# Patient Record
Sex: MALE | Race: OTHER | NOT HISPANIC OR LATINO | Employment: FULL TIME | URBAN - METROPOLITAN AREA
[De-identification: names, ages, dates, MRNs, and addresses within clinical notes are randomized per-mention and may not be internally consistent; named-entity substitution may affect disease eponyms.]

---

## 2018-10-11 ENCOUNTER — IMMUNIZATION (OUTPATIENT)
Dept: SOCIAL WORK | Facility: CLINIC | Age: 58
End: 2018-10-11
Payer: COMMERCIAL

## 2018-10-11 ENCOUNTER — HOSPITAL ENCOUNTER (OUTPATIENT)
Facility: MEDICAL CENTER | Age: 58
End: 2018-10-11
Payer: COMMERCIAL

## 2018-10-11 DIAGNOSIS — Z23 NEED FOR VACCINATION: ICD-10-CM

## 2018-10-11 PROCEDURE — 90686 IIV4 VACC NO PRSV 0.5 ML IM: CPT | Performed by: REGISTERED NURSE

## 2018-10-11 PROCEDURE — 82947 ASSAY GLUCOSE BLOOD QUANT: CPT

## 2018-10-11 PROCEDURE — 90471 IMMUNIZATION ADMIN: CPT | Performed by: REGISTERED NURSE

## 2018-10-11 PROCEDURE — 80061 LIPID PANEL: CPT

## 2018-10-12 LAB
CHOLEST SERPL-MCNC: 280 MG/DL (ref 100–199)
FASTING STATUS PATIENT QL REPORTED: NORMAL
GLUCOSE SERPL-MCNC: 114 MG/DL (ref 65–99)
HDLC SERPL-MCNC: 52 MG/DL
LDLC SERPL CALC-MCNC: 203 MG/DL
TRIGL SERPL-MCNC: 124 MG/DL (ref 0–149)

## 2019-07-19 ENCOUNTER — TELEPHONE (OUTPATIENT)
Dept: SCHEDULING | Facility: IMAGING CENTER | Age: 59
End: 2019-07-19

## 2019-09-25 ENCOUNTER — OFFICE VISIT (OUTPATIENT)
Dept: MEDICAL GROUP | Facility: MEDICAL CENTER | Age: 59
End: 2019-09-25
Payer: COMMERCIAL

## 2019-09-25 VITALS
TEMPERATURE: 97.6 F | HEIGHT: 66 IN | OXYGEN SATURATION: 94 % | RESPIRATION RATE: 16 BRPM | DIASTOLIC BLOOD PRESSURE: 82 MMHG | SYSTOLIC BLOOD PRESSURE: 132 MMHG | HEART RATE: 80 BPM | BODY MASS INDEX: 27.32 KG/M2 | WEIGHT: 170 LBS

## 2019-09-25 DIAGNOSIS — R73.09 ELEVATED HEMOGLOBIN A1C: ICD-10-CM

## 2019-09-25 DIAGNOSIS — E78.00 PURE HYPERCHOLESTEROLEMIA: ICD-10-CM

## 2019-09-25 DIAGNOSIS — I10 ESSENTIAL HYPERTENSION: ICD-10-CM

## 2019-09-25 DIAGNOSIS — L57.0 ACTINIC KERATOSIS: ICD-10-CM

## 2019-09-25 DIAGNOSIS — Z12.5 PROSTATE CANCER SCREENING: ICD-10-CM

## 2019-09-25 PROCEDURE — 17000 DESTRUCT PREMALG LESION: CPT | Performed by: NURSE PRACTITIONER

## 2019-09-25 PROCEDURE — 99204 OFFICE O/P NEW MOD 45 MIN: CPT | Mod: 25 | Performed by: NURSE PRACTITIONER

## 2019-09-25 RX ORDER — ATORVASTATIN CALCIUM 40 MG/1
40 TABLET, FILM COATED ORAL
Qty: 90 TAB | Refills: 3 | Status: SHIPPED | OUTPATIENT
Start: 2019-09-25 | End: 2020-11-05

## 2019-09-25 RX ORDER — LISINOPRIL 10 MG/1
10 TABLET ORAL DAILY
Qty: 90 TAB | Refills: 3 | Status: SHIPPED | OUTPATIENT
Start: 2019-09-25 | End: 2020-11-20 | Stop reason: SDUPTHER

## 2019-09-25 ASSESSMENT — PATIENT HEALTH QUESTIONNAIRE - PHQ9: CLINICAL INTERPRETATION OF PHQ2 SCORE: 0

## 2019-09-25 NOTE — ASSESSMENT & PLAN NOTE
Review of labs shows LDL over 200 at lipid screening for [facility] in October 2018.  He had previously been treated with atorvastatin 40 mg daily, ran out of refills and has been off medication more than a year and a half now.  He is agreeable to restarting, denies having had any side effects related to medication use.  He did have very slight ALT elevation in prior labs, reports that this was monitored periodically  No difficulty with chest pain, palpitation, activity intolerance

## 2019-09-25 NOTE — LETTER
[facility]  DELFINA Petersen.  [address]  [place] [address]  Fax: [phone]   Authorization for Release/Disclosure of   Protected Health Information   Name: SIMONA DELANEY : 1960 SSN: [id number]   Address: [address] [place], [place] [address] [place] [address] Phone:    [phone] (home)    I authorize the entity listed below to release/disclose the PHI below to:   [facility]/ELOY Petersen and ELOY Petersen   Provider or Entity Name:  [facility]   Address   City, State, [address] Phone:      Fax:     Reason for request: continuity of care   Information to be released:    [  ] LAST COLONOSCOPY,  including any PATH REPORT and follow-up  [  ] LAST FIT/COLOGUARD RESULT [  ] LAST DEXA  [  ] LAST MAMMOGRAM  [  ] LAST PAP  [  ] LAST LABS [  ] RETINA EXAM REPORT  [  ] IMMUNIZATION RECORDS  [ x ] Release all info      [  ] Check here and initial the line next to each item to release ALL health information INCLUDING  _____ Care and treatment for drug and / or alcohol abuse  _____ HIV testing, infection status, or AIDS  _____ Genetic Testing    DATES OF SERVICE OR TIME PERIOD TO BE DISCLOSED: _____________  I understand and acknowledge that:  * This Authorization may be revoked at any time by you in writing, except if your health information has already been used or disclosed.  * Your health information that will be used or disclosed as a result of you signing this authorization could be re-disclosed by the recipient. If this occurs, your re-disclosed health information may no longer be protected by State or Federal laws.  * You may refuse to sign this Authorization. Your refusal will not affect your ability to obtain treatment.  * This Authorization becomes effective upon signing and will  on (date) __________.      If no date is indicated, this Authorization will  one (1) year from the signature date.    Name: Simona Delaney    Signature:   Date:          9/25/2019       PLEASE FAX REQUESTED RECORDS BACK TO: [phone]

## 2019-09-25 NOTE — ASSESSMENT & PLAN NOTE
Review of labs shows very minimally elevated A1c at 5.8.  No polyuria, polydipsia, numbness or tingling in extremities.  He is active at work and around the house but not doing any specific cardio exercise

## 2019-09-25 NOTE — ASSESSMENT & PLAN NOTE
Rough scaly lesion on the left forearm which is been present for quite some time.  No personal history of skin cancers, no bleeding or pain

## 2019-09-25 NOTE — ASSESSMENT & PLAN NOTE
Previously managed with lisinopril 10 mg daily which had worked well for him, out of medication for more than a year.  Initial blood pressure reading taken by the MA in the office today is 132/82, repeat taken by me 138/82.  No chest pain, dizziness, palpitation.  No chronic cough with medication

## 2019-09-27 ENCOUNTER — OFFICE VISIT (OUTPATIENT)
Dept: DERMATOLOGY | Facility: IMAGING CENTER | Age: 59
End: 2019-09-27
Payer: COMMERCIAL

## 2019-09-27 ENCOUNTER — HOSPITAL ENCOUNTER (OUTPATIENT)
Dept: LAB | Facility: MEDICAL CENTER | Age: 59
End: 2019-09-27
Attending: NURSE PRACTITIONER
Payer: COMMERCIAL

## 2019-09-27 VITALS — WEIGHT: 170 LBS | HEIGHT: 66 IN | BODY MASS INDEX: 27.32 KG/M2 | TEMPERATURE: 97.8 F

## 2019-09-27 DIAGNOSIS — E78.00 PURE HYPERCHOLESTEROLEMIA: ICD-10-CM

## 2019-09-27 DIAGNOSIS — D22.9 MULTIPLE NEVI: ICD-10-CM

## 2019-09-27 DIAGNOSIS — Z12.5 PROSTATE CANCER SCREENING: ICD-10-CM

## 2019-09-27 DIAGNOSIS — L82.1 SEBORRHEIC KERATOSES: ICD-10-CM

## 2019-09-27 DIAGNOSIS — L57.0 ACTINIC KERATOSES: ICD-10-CM

## 2019-09-27 DIAGNOSIS — R73.09 ELEVATED HEMOGLOBIN A1C: ICD-10-CM

## 2019-09-27 DIAGNOSIS — L82.0 INFLAMED SEBORRHEIC KERATOSIS: ICD-10-CM

## 2019-09-27 DIAGNOSIS — L81.4 LENTIGINES: ICD-10-CM

## 2019-09-27 LAB
ALBUMIN SERPL BCP-MCNC: 4.9 G/DL (ref 3.2–4.9)
ALBUMIN/GLOB SERPL: 2.1 G/DL
ALP SERPL-CCNC: 51 U/L (ref 30–99)
ALT SERPL-CCNC: 29 U/L (ref 2–50)
ANION GAP SERPL CALC-SCNC: 9 MMOL/L (ref 0–11.9)
AST SERPL-CCNC: 22 U/L (ref 12–45)
BILIRUB SERPL-MCNC: 0.8 MG/DL (ref 0.1–1.5)
BUN SERPL-MCNC: 21 MG/DL (ref 8–22)
CALCIUM SERPL-MCNC: 9.7 MG/DL (ref 8.5–10.5)
CHLORIDE SERPL-SCNC: 103 MMOL/L (ref 96–112)
CHOLEST SERPL-MCNC: 255 MG/DL (ref 100–199)
CO2 SERPL-SCNC: 28 MMOL/L (ref 20–33)
CREAT SERPL-MCNC: 1.11 MG/DL (ref 0.5–1.4)
EST. AVERAGE GLUCOSE BLD GHB EST-MCNC: 117 MG/DL
GLOBULIN SER CALC-MCNC: 2.3 G/DL (ref 1.9–3.5)
GLUCOSE SERPL-MCNC: 93 MG/DL (ref 65–99)
HBA1C MFR BLD: 5.7 % (ref 0–5.6)
HDLC SERPL-MCNC: 49 MG/DL
LDLC SERPL CALC-MCNC: 181 MG/DL
POTASSIUM SERPL-SCNC: 4.1 MMOL/L (ref 3.6–5.5)
PROT SERPL-MCNC: 7.2 G/DL (ref 6–8.2)
PSA SERPL-MCNC: 2.44 NG/ML (ref 0–4)
SODIUM SERPL-SCNC: 140 MMOL/L (ref 135–145)
TRIGL SERPL-MCNC: 124 MG/DL (ref 0–149)

## 2019-09-27 PROCEDURE — 36415 COLL VENOUS BLD VENIPUNCTURE: CPT

## 2019-09-27 PROCEDURE — 17110 DESTRUCTION B9 LES UP TO 14: CPT | Performed by: NURSE PRACTITIONER

## 2019-09-27 PROCEDURE — 17000 DESTRUCT PREMALG LESION: CPT | Mod: 59 | Performed by: NURSE PRACTITIONER

## 2019-09-27 PROCEDURE — 80053 COMPREHEN METABOLIC PANEL: CPT

## 2019-09-27 PROCEDURE — 80061 LIPID PANEL: CPT

## 2019-09-27 PROCEDURE — 99202 OFFICE O/P NEW SF 15 MIN: CPT | Mod: 25 | Performed by: NURSE PRACTITIONER

## 2019-09-27 PROCEDURE — 17003 DESTRUCT PREMALG LES 2-14: CPT | Mod: 59 | Performed by: NURSE PRACTITIONER

## 2019-09-27 PROCEDURE — 84153 ASSAY OF PSA TOTAL: CPT

## 2019-09-27 PROCEDURE — 83036 HEMOGLOBIN GLYCOSYLATED A1C: CPT

## 2019-09-27 ASSESSMENT — ENCOUNTER SYMPTOMS
CHILLS: 0
DIAPHORESIS: 0
FEVER: 0
WEIGHT LOSS: 0

## 2019-09-27 NOTE — PROGRESS NOTES
"  Dermatology New Patient Visit    Chief Complaint   Patient presents with   • Skin Lesion       Subjective:     HPI:   Camron Mcmahon is a 59 y.o. male presenting for    Here today for spot check  Declines full body SARAY-agrees to sun exposed skin  Spouse noticed two brown lesions     HPI/location: brown lesions on temples  Time present: 1 year  Painful lesion: No  Itching lesion: mild-more irritating   Enlarging lesion: No  Anything make it better or worse? Pt picks them and lesion gets smaller    History of skin cancer: No  History of precancers/actinic keratoses: yes, LT forearm cryac this month by PCP  History of biopsies:No  History of blistering/severe sunburns: yes   Family history of skin cancer:No  Family history of atypical moles:No              Past Medical History:   Diagnosis Date   • Bell's palsy 1980\"s    right face-twitches sometimes still   • Hyperlipidemia    • Hypertension    • Sleep apnea     cpap #12 pressure, no o2   • Snoring        Current Outpatient Medications on File Prior to Visit   Medication Sig Dispense Refill   • atorvastatin (LIPITOR) 40 MG Tab Take 1 Tab by mouth every bedtime. 90 Tab 3   • lisinopril (PRINIVIL) 10 MG Tab Take 1 Tab by mouth every day. 90 Tab 3   • B Complex Vitamins (VITAMIN B COMPLEX PO) Take 1 Tab by mouth every day. Unknown dose   Indications: Nutritional Support     • ascorbic acid (ASCORBIC ACID) 500 MG TABS Take 500 mg by mouth every day.       No current facility-administered medications on file prior to visit.        No Known Allergies    Family History   Problem Relation Age of Onset   • Stroke Mother    • Hypertension Mother    • Hyperlipidemia Mother    • Heart Disease Father    • Hypertension Brother        Social History     Socioeconomic History   • Marital status:      Spouse name: Not on file   • Number of children: Not on file   • Years of education: Not on file   • Highest education level: Not on file   Occupational History   • Not on file " "  Social Needs   • Financial resource strain: Not on file   • Food insecurity:     Worry: Not on file     Inability: Not on file   • Transportation needs:     Medical: Not on file     Non-medical: Not on file   Tobacco Use   • Smoking status: Never Smoker   • Smokeless tobacco: Never Used   Substance and Sexual Activity   • Alcohol use: Yes     Comment: beer twice weekly, last dose 1 beer 5/8/13   • Drug use: No   • Sexual activity: Not on file   Lifestyle   • Physical activity:     Days per week: Not on file     Minutes per session: Not on file   • Stress: Not on file   Relationships   • Social connections:     Talks on phone: Not on file     Gets together: Not on file     Attends Bahai service: Not on file     Active member of club or organization: Not on file     Attends meetings of clubs or organizations: Not on file     Relationship status: Not on file   • Intimate partner violence:     Fear of current or ex partner: Not on file     Emotionally abused: Not on file     Physically abused: Not on file     Forced sexual activity: Not on file   Other Topics Concern   • Not on file   Social History Narrative   • Not on file       Review of Systems   Constitutional: Negative for chills, diaphoresis, fever, malaise/fatigue and weight loss.   Skin: Positive for itching. Negative for rash.        Objective:     A focused mucocutaneous exam was completed including: scalp, hair, ears, face, eyelids, conjunctiva, lips, neck, chest, abdomen, back, left and right upper extremities (including hands/digits and fingernails), left and right lower extremities  with the following pertinent findings listed below. Remaining above-listed examined areas within normal limits / negative for rashes or lesions.    Temp 36.6 °C (97.8 °F)   Ht 1.676 m (5' 6\")   Wt 77.1 kg (170 lb)     Physical Exam   Constitutional: He is well-developed, well-nourished, and in no distress. No distress.   HENT:   Head: Normocephalic.       Eyes: " Conjunctivae are normal.   Pulmonary/Chest: Effort normal.   Neurological: He is alert.   Skin: Skin is warm and dry. No rash noted.        Psychiatric: Affect normal.   Vitals reviewed.      DATA: none applicable to review    Assessment and Plan:     1. Actinic keratoses  CRYOTHERAPY:  Risks (including, but not limited to: hypo or hyperpigmentation, redness, blister, blood blister, recurrence, need for further treatment, infection, scar) and benefits of cryotherapy discussed. Patient verbally agreed to proceed with treatment. 2 cryotherapy freeze thaw cycles of 10 seconds were applied to 3 lesions on left temple and both ear helices with cryac. Patient tolerated procedure well. Aftercare instructions given.      2. Inflamed seborrheic keratosis  CRYOTHERAPY:  Risks (including, but not limited to: hypo or hyperpigmentation, redness, blister, blood blister, recurrence, need for further treatment, infection, scar) and benefits of cryotherapy discussed. Patient verbally agreed to proceed with treatment. 2 cryotherapy freeze thaw cycles of 10 seconds were applied to 1 lesion on right temple with cryac. Patient tolerated procedure well. Aftercare instructions given.      3. Seborrheic keratoses  - Benign-appearing nature of lesions discussed. Advised to return to clinic for any new or concerning changes.      4. Lentigines  - Discussed benign nature of lesions, related to sun exposure  - Discussed sun protection, hand out provided      5. Multiple nevi  - Benign-appearing nature of lesions discussed. Advised to return to clinic for any new or concerning changes.  Skin cancer education  - discussed importance of sun protective clothing, eyewear  - discussed importance of daily use of broad spectrum sunscreen with SPF 30 or greater, as well as need for reapplication ~every 2 hours when exposed to UVR  - discussed importance of regular self-exams, ideally once per month, every 12 months exams in clinic  - KAYLA's of  melanoma discussed  - patient to bring any new or concerning lesions to my attention        Followup: Return in about 1 year (around 9/27/2020) for for SARAY or sooner for changes.    DELFINA [place].

## 2019-09-27 NOTE — PATIENT INSTRUCTIONS
Actinic Keratosis  An actinic keratosis is a precancerous growth on the skin. This means that it could develop into skin cancer if it is not treated. About 1% of these growths (actinic keratoses) turn into skin cancer within one year if they are not treated. It is important to have all of these growths evaluated to determine the best treatment approach.  What are the causes?  This condition is caused by getting too much ultraviolet (UV) radiation from the sun or other UV light sources.  What increases the risk?  The following factors may make you more likely to develop this condition:  · Having light-colored skin and blue eyes.  · Having blonde or red hair.  · Spending a lot of time in the sun.  · Inadequate skin protection when outdoors. This may include:  ¨ Not using sunscreen properly.  ¨ Not covering up skin that is exposed to sunlight.  · Aging. The risk of developing an actinic keratosis increases with age.  What are the signs or symptoms?  Actinic keratoses look like scaly, rough spots of skin. They can be as small as a pinhead or as big as a quarter. They may itch, hurt, or feel sensitive. In most cases, the growths become red. In some cases, they may be skin-colored, light tan, dark tan, pink, or a combination of any of these colors. There may be a small piece of pink or gray skin (skin tag) growing from the actinic keratosis. In some cases, it may be easier to notice actinic keratoses by feeling them, rather than seeing them.  Actinic keratoses appear most often on areas of skin that get a lot of sun exposure, including the scalp, face, ears, lips, upper back, forearms, and the backs of the hands. Sometimes, actinic keratoses disappear, but many reappear a few days to a few weeks later.  How is this diagnosed?  This condition is usually diagnosed with a physical exam. A tissue sample may be removed from the actinic keratosis and examined under a microscope (biopsy).  How is this treated?     Treatment for  this condition may include:  · Scraping off the actinic keratosis (curettage).  · Freezing the actinic keratosis with liquid nitrogen (cryosurgery). This causes the growth to eventually fall off the skin.  · Applying medicated creams or gels to destroy the cells in the growth.  · Applying chemicals to the actinic keratosis to make the outer layers of skin peel off (chemical peel).  · Photodynamic therapy. In this procedure, medicated cream is applied to the actinic keratosis. This cream increases your skin’s sensitivity to light. Then, a strong light is aimed at the actinic keratosis to destroy cells in the growth.  Follow these instructions at home:  Skin care  · Apply cool, wet cloths (cool compresses) to the affected areas.  · Do not scratch your skin.  · Check your skin regularly for any growths, especially growths that:  ¨ Start to itch or bleed.  ¨ Change in size, shape, or color.  Caring for the treated area  · Keep the treated area clean and dry as told by your health care provider.  · Do not apply any medicine, cream, or lotion to the treated area unless your health care provider tells you to do that.  · Do not pick at blisters or try to break them open. This can cause infection and scarring.  · If you have red or irritated skin after treatment, follow instructions from your health care provider about how to take care of the treated area. Make sure you:  ¨ Wash your hands with soap and water before you change your bandage (dressing). If soap and water are not available, use hand .  ¨ Change your dressing as told by your health care provider.  · If you have red or irritated skin after treatment, check your treated area every day for signs of infection. Check for:  ¨ Swelling, pain, or more redness.  ¨ Fluid or blood.  ¨ Warmth.  ¨ Pus or a bad smell.  General instructions  · Take over-the-counter and prescription medicines only as told by your health care provider.  · Return to your normal  activities as told by your health care provider. Ask your health care provider what activities are safe for you.  · Do not use any tobacco products, such as cigarettes, chewing tobacco, and e-cigarettes. If you need help quitting, ask your health care provider.  · Have a skin exam done every year by a health care provider who is a skin conditions specialist (dermatologist).  · Keep all follow-up visits as told by your health care provider. This is important.  How is this prevented?  · Do not get sunburns.  · Try to avoid the sun between 10:00 a.m. and 4:00 p.m. This is when the UV light is the strongest.  · Use a sunscreen or sunblock with SPF 30 (sun protection factor 30) or greater.  · Apply sunscreen before you are exposed to sunlight, and reapply periodically as often as directed by the instructions on the sunscreen container.  · Always wear sunglasses that have UV protection, and always wear hats and clothing to protect your skin from sunlight.  · When possible, avoid medicines that increase your sensitivity to sunlight. These include:  ¨ Certain antibiotic medicines.  ¨ Certain water pills (diuretics).  ¨ Certain prescription medicines that are used to treat acne (retinoids).  · Do not use tanning beds or other indoor tanning devices.  Contact a health care provider if:  · You notice any changes or new growths on your skin.  · You have swelling, pain, or more redness around your treated area.  · You have fluid or blood coming from your treated area.  · Your treated area feels warm to the touch.  · You have pus or a bad smell coming from your treated area.  · You have a fever.  · You have a blister that becomes large and painful.  This information is not intended to replace advice given to you by your health care provider. Make sure you discuss any questions you have with your health care provider.  Document Released: 03/16/2010 Document Revised: 08/18/2017 Document Reviewed: 08/27/2016  [employer]  "Patient Education © 2017 Elsevier Inc.    Seborrheic Keratosis  Seborrheic keratosis is a common, noncancerous (benign) skin growth. This condition causes waxy, rough, tan, brown, or black spots to appear on the skin. These skin growths can be flat or raised.  What are the causes?  The cause of this condition is not known.  What increases the risk?  This condition is more likely to develop in:  · People who have a family history of seborrheic keratosis.  · People who are 50 or older.  · People who are pregnant.  · People who have had estrogen replacement therapy.  What are the signs or symptoms?  This condition often occurs on the face, chest, shoulders, back, or other areas. These growths:  · Are usually painless, but may become irritated and itchy.  · Can be yellow, brown, black, or other colors.  · Are slightly raised or have a flat surface.  · Are sometimes rough or wart-like in texture.  · Are often waxy on the surface.  · Are round or oval-shaped.  · Sometimes look like they are \"stuck on.”  · Often occur in groups, but may occur as a single growth.  How is this diagnosed?  This condition is diagnosed with a medical history and physical exam. A sample of the growth may be tested (skin biopsy). You may need to see a skin specialist (dermatologist).  How is this treated?  Treatment is not usually needed for this condition, unless the growths are irritated or are often bleeding. You may also choose to have the growths removed if you do not like their appearance. Most commonly, these growths are treated with a procedure in which liquid nitrogen is applied to “freeze” off the growth (cryosurgery). They may also be burned off with electricity or cut off.  Follow these instructions at home:  · Watch your growth for any changes.  · Keep all follow-up visits as told by your health care provider. This is important.  · Do not scratch or pick at the growth or growths. This can cause them to become irritated or " infected.  Contact a health care provider if:  · You suddenly have many new growths.  · Your growth bleeds, itches, or hurts.  · Your growth suddenly becomes larger or changes color.  This information is not intended to replace advice given to you by your health care provider. Make sure you discuss any questions you have with your health care provider.  Document Released: 01/20/2012 Document Revised: 05/25/2017 Document Reviewed: 05/04/2016  Elsevier Interactive Patient Education © 2017 Elsevier Inc.

## 2019-09-29 DIAGNOSIS — E78.00 PURE HYPERCHOLESTEROLEMIA: ICD-10-CM

## 2019-09-29 RX ORDER — ROSUVASTATIN CALCIUM 20 MG/1
20 TABLET, COATED ORAL EVERY EVENING
Qty: 90 TAB | Refills: 1 | Status: SHIPPED | OUTPATIENT
Start: 2019-09-29 | End: 2020-11-20 | Stop reason: SDUPTHER

## 2019-10-01 ENCOUNTER — TELEPHONE (OUTPATIENT)
Dept: MEDICAL GROUP | Facility: MEDICAL CENTER | Age: 59
End: 2019-10-01

## 2019-10-01 NOTE — TELEPHONE ENCOUNTER
----- Message from ELOY Petersen sent at 9/29/2019  9:25 AM PDT -----  Please inform pt cholesterol is very high, recommend resuming medication to treat this and reduce risk for heart disease. I have sent prescription to his pharmacy for 1 tab daily, typically at bedtime. We should repeat labs in 3 months, orders placed. Yaneth WISE

## 2019-11-21 ENCOUNTER — OCCUPATIONAL MEDICINE (OUTPATIENT)
Dept: URGENT CARE | Facility: CLINIC | Age: 59
End: 2019-11-21
Payer: COMMERCIAL

## 2019-11-21 VITALS
RESPIRATION RATE: 16 BRPM | DIASTOLIC BLOOD PRESSURE: 74 MMHG | OXYGEN SATURATION: 95 % | TEMPERATURE: 97.8 F | BODY MASS INDEX: 27.92 KG/M2 | SYSTOLIC BLOOD PRESSURE: 126 MMHG | WEIGHT: 173 LBS | HEART RATE: 84 BPM

## 2019-11-21 DIAGNOSIS — S61.213A LACERATION OF LEFT MIDDLE FINGER WITHOUT FOREIGN BODY WITHOUT DAMAGE TO NAIL, INITIAL ENCOUNTER: Primary | ICD-10-CM

## 2019-11-21 LAB
AMP AMPHETAMINE: NORMAL
BREATH ALCOHOL COMMENT: NORMAL
COC COCAINE: NORMAL
INT CON NEG: NORMAL
INT CON POS: NORMAL
MET METHAMPHETAMINES: NORMAL
OPI OPIATES: NORMAL
PCP PHENCYCLIDINE: NORMAL
POC BREATHALIZER: 0 PERCENT (ref 0–0.01)
POC DRUG COMMENT 753798-OCCUPATIONAL HEALTH: NEGATIVE
THC: NORMAL

## 2019-11-21 PROCEDURE — 82075 ASSAY OF BREATH ETHANOL: CPT | Mod: 29 | Performed by: NURSE PRACTITIONER

## 2019-11-21 PROCEDURE — 80305 DRUG TEST PRSMV DIR OPT OBS: CPT | Mod: 29 | Performed by: NURSE PRACTITIONER

## 2019-11-21 PROCEDURE — 12001 RPR S/N/AX/GEN/TRNK 2.5CM/<: CPT | Mod: 29,F2 | Performed by: NURSE PRACTITIONER

## 2019-11-21 ASSESSMENT — ENCOUNTER SYMPTOMS
ROS SKIN COMMENTS: LACERATION
SENSORY CHANGE: 0
PALPITATIONS: 0
CHILLS: 0
TINGLING: 0
SHORTNESS OF BREATH: 0
WHEEZING: 0
FEVER: 0

## 2019-11-21 NOTE — LETTER
[address] [place] [address]  Phone:  [phone] - Fax:  [phone]   [facility] Progress Report and Disability Certification  Date of Service: 11/21/2019   No Show:  No  Date / Time of Next Visit: 12/1/2019   Claim Information   Patient Name: Camron Mcmahon  Claim Number:     Employer: [employer]  Date of Injury: 11/21/2019     Insurer / TPA: [employer]  ID / SSN:     Occupation:   Diagnosis: The encounter diagnosis was Laceration of left middle finger without foreign body without damage to nail, initial encounter.    Medical Information   Related to Industrial Injury? Yes    Subjective Complaints:  DOI 11/21/2019: Patient states was cleaning lamb bones and cutting with knife and knife went through the rack and accidentally cut his left middle finger. States with mild bleeding. Denies numbness/tingling. Last tetanus 2016   Objective Findings: Left middle finger: ROM normal. Laceration 1cm in length to the dorsal surface with minimal bleeding. No tendon involvement or foreign body.    Pre-Existing Condition(s): None   Assessment:   Initial Visit    Status: Additional Care Required  Permanent Disability:No    Plan:      Diagnostics:      Comments:  x2 sutures placed in office. During laceration repair, patient became dizzy and lightheaded/clammy skin and was immediately placed on exam table and reclined. Cool wash cloths and water were given to patient and he was monitored for 15 minutes after   the procedure. Symptoms resolved and patient states he felt better. Vitals stable and appearance returned to normal.    Disability Information   Status: Released to Full Duty    From:  11/21/2019  Through: 12/1/2019 Restrictions are: Temporary   Physical Restrictions   Sitting:    Standing:    Stooping:    Bending:      Squatting:    Walking:    Climbing:    Pushing:      Pulling:    Other:    Reaching Above Shoulder (L):   Reaching Above  Shoulder (R):       Reaching Below Shoulder (L):    Reaching Below Shoulder (R):      Not to exceed Weight Limits   Carrying(hrs):   Weight Limit(lb):   Lifting(hrs):   Weight  Limit(lb):     Comments: x2 sutures placed. Keep covered with Polysporin and bandaid while at work. Return to office 12/1 for suture removal    Repetitive Actions   Hands: i.e. Fine Manipulations from Grasping:     Feet: i.e. Operating Foot Controls:     Driving / Operate Machinery:     Physician Name: ELOY Madden Physician Signature: JOHNNY Lam e-Signature: Dr. Thiago Pratt, Medical Director   Clinic Name / Location: [address] [place] [address] Clinic Phone Number: Dept: [phone]   Appointment Time: 4:30 Pm Visit Start Time: 5:56 PM   Check-In Time:  5:01 Pm Visit Discharge Time:    Original-Treating Physician or Chiropractor    Page 2-Insurer/TPA    Page 3-Employer    Page 4-Employee

## 2019-11-21 NOTE — LETTER
[address] [place] [address]  Phone:  [phone] - Fax:  [phone]   [facility] Progress Report and Disability Certification  Date of Service: 11/21/2019   No Show:  No  Date / Time of Next Visit: 12/1/2019 @ 9:30 AM    Claim Information   Patient Name: Camron Mcmahon  Claim Number:     Employer: [employer]  Date of Injury: 11/21/2019     Insurer / TPA: [employer]  ID / SSN:     Occupation:   Diagnosis: The encounter diagnosis was Laceration of left middle finger without foreign body without damage to nail, initial encounter.    Medical Information   Related to Industrial Injury? Yes    Subjective Complaints:  DOI 11/21/2019: Patient states was cleaning lamb bones and cutting with knife and knife went through the rack and accidentally cut his left middle finger. States with mild bleeding. Denies numbness/tingling. Last tetanus 2016   Objective Findings: Left middle finger: ROM normal. Laceration 1cm in length to the dorsal surface with minimal bleeding. No tendon involvement or foreign body.    Pre-Existing Condition(s): None   Assessment:   Initial Visit    Status: Additional Care Required  Permanent Disability:No    Plan:      Diagnostics:      Comments:  x2 sutures placed in office. During laceration repair, patient became dizzy and lightheaded/clammy skin and was immediately placed on exam table and reclined. Cool wash cloths and water were given to patient and he was monitored for 15 minutes after   the procedure. Symptoms resolved and patient states he felt better. Vitals stable and appearance returned to normal.    Disability Information   Status: Released to Full Duty    From:  11/21/2019  Through: 12/1/2019 Restrictions are: Temporary   Physical Restrictions   Sitting:    Standing:    Stooping:    Bending:      Squatting:    Walking:    Climbing:    Pushing:      Pulling:    Other:    Reaching Above Shoulder (L):   Reaching  Above Shoulder (R):       Reaching Below Shoulder (L):    Reaching Below Shoulder (R):      Not to exceed Weight Limits   Carrying(hrs):   Weight Limit(lb):   Lifting(hrs):   Weight  Limit(lb):     Comments: x2 sutures placed. Keep covered with Polysporin and bandaid while at work. Return to office 12/1 for suture removal    Repetitive Actions   Hands: i.e. Fine Manipulations from Grasping:     Feet: i.e. Operating Foot Controls:     Driving / Operate Machinery:     Physician Name: ELOY Madden Physician Signature: JOHNNY Lam e-Signature: Dr. Thiago Pratt, Medical Director   Clinic Name / Location: [address] [place] [id number] Clinic Phone Number: Dept: [phone]   Appointment Time: 4:30 Pm Visit Start Time: 5:56 PM   Check-In Time:  5:01 Pm Visit Discharge Time:  6:57 PM   Original-Treating Physician or Chiropractor    Page 2-Insurer/TPA    Page 3-Employer    Page 4-Employee

## 2019-11-22 NOTE — PROGRESS NOTES
Subjective:   Camron Mcmahon is a 59 y.o. male who presents for Laceration (NEW WC DOI 11/21/2019 (L) middle finger)    DOI 11/21/2019: Patient states was cleaning lamb bones and cutting with knife and knife went through the rack and accidentally cut his left middle finger. States with mild bleeding. Denies numbness/tingling. Last tetanus 2016   HPI       Review of Systems   Constitutional: Negative for chills and fever.   Respiratory: Negative for shortness of breath and wheezing.    Cardiovascular: Negative for chest pain and palpitations.   Musculoskeletal: Negative for joint pain.   Skin:        Laceration   Neurological: Negative for tingling and sensory change.     Patient's PMH, SocHx, SurgHx, FamHx, Drug allergies and medications reviewed.     Objective:   /74 (BP Location: Left arm, Patient Position: Sitting, BP Cuff Size: Adult)   Pulse 84   Temp 36.6 °C (97.8 °F) (Temporal)   Resp 16   Wt 78.5 kg (173 lb)   SpO2 95%   BMI 27.92 kg/m²   Physical Exam  Constitutional:       General: He is not in acute distress.     Appearance: He is well-developed. He is not diaphoretic.   HENT:      Head: Normocephalic.      Right Ear: Hearing normal.      Left Ear: Hearing normal.      Nose: Nose normal.   Eyes:      General: Lids are normal.      Conjunctiva/sclera: Conjunctivae normal.      Pupils: Pupils are equal, round, and reactive to light.   Neck:      Musculoskeletal: Normal range of motion.   Cardiovascular:      Rate and Rhythm: Normal rate and regular rhythm.      Heart sounds: Normal heart sounds.   Pulmonary:      Effort: Pulmonary effort is normal. No respiratory distress.      Breath sounds: Normal breath sounds. No decreased breath sounds or wheezing.   Musculoskeletal:        Hands:    Skin:     General: Skin is warm.      Findings: No rash.   Neurological:      Mental Status: He is alert.   Psychiatric:         Speech: Speech normal.         Behavior: Behavior normal.         Thought Content:  Thought content normal.         Judgment: Judgment normal.       Left middle finger: ROM normal. Laceration 1cm in length to the dorsal surface with minimal bleeding. No tendon involvement or foreign body.    Assessment/Plan:   Assessment    1. Laceration of left middle finger without foreign body without damage to nail, initial encounter    Procedure: Laceration Repair  -Risks including bleeding, nerve damage, infection, and poor cosmetic outcome discussed. Benefits and alternatives discussed.   -Clean technique with sterile instruments and suture used  -Local anesthesia with 2% lidocaine  -Closed with 2 #  6-0 Nylon interrupted sutures with good wound approximation  -Polysporin and dressing placed  -Patient tolerated well    Restrictions per D 39      Differential diagnosis, natural history, supportive care, and indications for immediate follow-up discussed.     **Please note that all invasive procedures during this visit were performed by myself and/or the Medical Assistant under the supervision of the PA or MD in office**

## 2019-12-02 ENCOUNTER — OCCUPATIONAL MEDICINE (OUTPATIENT)
Dept: OCCUPATIONAL MEDICINE | Facility: CLINIC | Age: 59
End: 2019-12-02
Payer: COMMERCIAL

## 2019-12-02 VITALS
SYSTOLIC BLOOD PRESSURE: 144 MMHG | DIASTOLIC BLOOD PRESSURE: 76 MMHG | WEIGHT: 174 LBS | TEMPERATURE: 97 F | OXYGEN SATURATION: 98 % | HEART RATE: 67 BPM | HEIGHT: 66 IN | BODY MASS INDEX: 27.97 KG/M2

## 2019-12-02 DIAGNOSIS — S61.213D LACERATION OF LEFT MIDDLE FINGER WITHOUT FOREIGN BODY WITHOUT DAMAGE TO NAIL, SUBSEQUENT ENCOUNTER: ICD-10-CM

## 2019-12-02 PROCEDURE — 99202 OFFICE O/P NEW SF 15 MIN: CPT | Performed by: PREVENTIVE MEDICINE

## 2019-12-02 RX ORDER — SIMVASTATIN 20 MG
20 TABLET ORAL NIGHTLY
COMMUNITY
End: 2020-11-05

## 2019-12-02 NOTE — PROGRESS NOTES
"Subjective:      Camron Mcmahon is a 59 y.o. male who presents with Other (WC DOI 11/21/19 middle finger, worse, room16)      DOI 11/21/2019: Patient states was cleaning lamb bones and cutting with knife and knife went through the rack and accidentally cut his left middle finger.  Wound repaired in the urgent care.  Patient states that wound is well-healed.  Denies redness, swelling or pain at the digit.  Denies numbness or tingling.     HPI    ROS  ROS: All systems were reviewed on intake form, form was reviewed and signed. See scanned documents in media. Pertinent positives and negatives included in HPI.    PMH: No pertinent past medical history to this problem  MEDS: Medications were reviewed in Epic  ALLERGIES: No Known Allergies  SOCHX: Works as a  at [employer]   FH: No pertinent family history to this problem     Objective:     /76   Pulse 67   Temp 36.1 °C (97 °F)   Ht 1.676 m (5' 6\")   Wt 78.9 kg (174 lb)   SpO2 98%   BMI 28.08 kg/m²      Physical Exam  Constitutional:       Appearance: Normal appearance.   Cardiovascular:      Rate and Rhythm: Normal rate.   Pulmonary:      Effort: Pulmonary effort is normal.   Skin:     General: Skin is warm and dry.   Neurological:      General: No focal deficit present.      Mental Status: He is alert and oriented to person, place, and time.   Psychiatric:         Mood and Affect: Mood normal.         Thought Content: Thought content normal.         Judgment: Judgment normal.         Left hand: Well-healed laceration left middle finger.  Sutures intact.  No erythema, swelling or drainage.  No heat to the wound.    Removed sutures without complication.       Assessment/Plan:       1. Laceration of left middle finger without foreign body without damage to nail, subsequent encounter  No special care required at this point  Released from care  Full duty  Follow-up as needed    "

## 2019-12-02 NOTE — LETTER
[address],   [address] [place] [address]  Phone:  [phone] - Fax:  [phone]   [facility] Progress Report and Disability Certification  Date of Service: 12/2/2019   No Show:  No  Date / Time of Next Visit:  MMI   Claim Information   Patient Name: Camron Mcmahon  Claim Number:     Employer: [employer]  Date of Injury: 11/21/2019     Insurer / TPA: [employer]  ID / SSN:     Occupation:   Diagnosis: The encounter diagnosis was Laceration of left middle finger without foreign body without damage to nail, subsequent encounter.    Medical Information   Related to Industrial Injury? Yes    Subjective Complaints:  DOI 11/21/2019: Patient states was cleaning lamb bones and cutting with knife and knife went through the rack and accidentally cut his left middle finger.  Wound repaired in the urgent care.  Patient states that wound is well-healed.  Denies redness, swelling or pain at the digit.  Denies numbness or tingling.   Objective Findings: Left hand: Well-healed laceration left middle finger.  Sutures intact.  No erythema, swelling or drainage.  No heat to the wound.    Removed sutures without complication.   Pre-Existing Condition(s):     Assessment:   Condition Improved    Status: Discharged /  MMI  Permanent Disability:No    Plan:      Diagnostics:      Comments:  No special care required at this point  Released from care  Full duty  Follow-up as needed    Disability Information   Status: Released to Full Duty    From:  12/2/2019  Through:   Restrictions are:     Physical Restrictions   Sitting:    Standing:    Stooping:    Bending:      Squatting:    Walking:    Climbing:    Pushing:      Pulling:    Other:    Reaching Above Shoulder (L):   Reaching Above Shoulder (R):       Reaching Below Shoulder (L):    Reaching Below Shoulder (R):      Not to exceed Weight Limits   Carrying(hrs):   Weight Limit(lb):   Lifting(hrs):   Weight   Limit(lb):     Comments:      Repetitive Actions   Hands: i.e. Fine Manipulations from Grasping:     Feet: i.e. Operating Foot Controls:     Driving / Operate Machinery:     Physician Name: Constantine Butcher D.O. Physician Signature: [email or web address] PATEL D.O. e-Signature: Dr. Thiago Pratt, Medical Director   Clinic Name / Location: [address],   [address]  [place] [address] Clinic Phone Number: Dept: [phone]   Appointment Time: 2:00 Pm Visit Start Time: 9:39 AM   Check-In Time:  9:31 Am Visit Discharge Time:  9:53am   Original-Treating Physician or Chiropractor    Page 2-Insurer/TPA    Page 3-Employer    Page 4-Employee

## 2020-05-26 ENCOUNTER — HOSPITAL ENCOUNTER (OUTPATIENT)
Facility: MEDICAL CENTER | Age: 60
End: 2020-05-26
Payer: COMMERCIAL

## 2020-05-29 LAB
SARS-COV-2 RNA SPEC QL NAA+PROBE: NOT DETECTED
SPECIMEN SOURCE: NORMAL

## 2020-10-15 ENCOUNTER — IMMUNIZATION (OUTPATIENT)
Dept: SOCIAL WORK | Facility: CLINIC | Age: 60
End: 2020-10-15
Payer: COMMERCIAL

## 2020-10-15 DIAGNOSIS — Z23 NEED FOR VACCINATION: ICD-10-CM

## 2020-10-15 PROCEDURE — 90471 IMMUNIZATION ADMIN: CPT | Performed by: REGISTERED NURSE

## 2020-10-15 PROCEDURE — 90686 IIV4 VACC NO PRSV 0.5 ML IM: CPT | Performed by: REGISTERED NURSE

## 2020-10-31 ENCOUNTER — HOSPITAL ENCOUNTER (OUTPATIENT)
Dept: LAB | Facility: MEDICAL CENTER | Age: 60
End: 2020-10-31
Payer: COMMERCIAL

## 2020-11-01 LAB — COVID ORDER STATUS COVID19: NORMAL

## 2020-11-02 LAB
SARS-COV-2 RNA RESP QL NAA+PROBE: NOTDETECTED
SPECIMEN SOURCE: NORMAL

## 2020-11-05 ENCOUNTER — HOSPITAL ENCOUNTER (OUTPATIENT)
Dept: RADIOLOGY | Facility: MEDICAL CENTER | Age: 60
End: 2020-11-05
Attending: NURSE PRACTITIONER
Payer: COMMERCIAL

## 2020-11-05 ENCOUNTER — OFFICE VISIT (OUTPATIENT)
Dept: MEDICAL GROUP | Facility: MEDICAL CENTER | Age: 60
End: 2020-11-05
Payer: COMMERCIAL

## 2020-11-05 VITALS
TEMPERATURE: 98.1 F | SYSTOLIC BLOOD PRESSURE: 130 MMHG | WEIGHT: 178 LBS | HEIGHT: 65 IN | RESPIRATION RATE: 16 BRPM | HEART RATE: 74 BPM | OXYGEN SATURATION: 96 % | DIASTOLIC BLOOD PRESSURE: 80 MMHG | BODY MASS INDEX: 29.66 KG/M2

## 2020-11-05 DIAGNOSIS — Z12.11 COLON CANCER SCREENING: ICD-10-CM

## 2020-11-05 DIAGNOSIS — Z23 NEED FOR SHINGLES VACCINE: ICD-10-CM

## 2020-11-05 DIAGNOSIS — R73.09 ELEVATED HEMOGLOBIN A1C: ICD-10-CM

## 2020-11-05 DIAGNOSIS — Z12.5 SCREENING FOR PROSTATE CANCER: ICD-10-CM

## 2020-11-05 DIAGNOSIS — S49.90XA INJURY OF CLAVICLE, INITIAL ENCOUNTER: ICD-10-CM

## 2020-11-05 DIAGNOSIS — Z00.00 ANNUAL PHYSICAL EXAM: ICD-10-CM

## 2020-11-05 DIAGNOSIS — Z11.59 NEED FOR HEPATITIS C SCREENING TEST: ICD-10-CM

## 2020-11-05 DIAGNOSIS — I10 ESSENTIAL HYPERTENSION: ICD-10-CM

## 2020-11-05 DIAGNOSIS — E78.00 PURE HYPERCHOLESTEROLEMIA: ICD-10-CM

## 2020-11-05 DIAGNOSIS — R73.01 ELEVATED FASTING GLUCOSE: ICD-10-CM

## 2020-11-05 DIAGNOSIS — Z12.83 SKIN CANCER SCREENING: ICD-10-CM

## 2020-11-05 DIAGNOSIS — L57.0 ACTINIC KERATOSIS: ICD-10-CM

## 2020-11-05 PROCEDURE — 99213 OFFICE O/P EST LOW 20 MIN: CPT | Mod: 25 | Performed by: NURSE PRACTITIONER

## 2020-11-05 PROCEDURE — 73000 X-RAY EXAM OF COLLAR BONE: CPT | Mod: RT

## 2020-11-05 PROCEDURE — 99396 PREV VISIT EST AGE 40-64: CPT | Mod: 25 | Performed by: NURSE PRACTITIONER

## 2020-11-05 PROCEDURE — 90750 HZV VACC RECOMBINANT IM: CPT | Performed by: NURSE PRACTITIONER

## 2020-11-05 PROCEDURE — 90471 IMMUNIZATION ADMIN: CPT | Performed by: NURSE PRACTITIONER

## 2020-11-05 NOTE — PROGRESS NOTES
Chief Complaint   Patient presents with   • Annual Exam   • Other     R collar bone popped out     Camron Mcmahon is a 60 y.o. male here for annual exam    Essential hypertension  Chronic issue managed with lisinopril 10 mg daily although patient tells me today has only been taking half the dose, no particular reason for this.  Borderline blood pressure in clinic today as well as last year.  No chest pain, dizziness, palpitation    Actinic keratosis  Multiple areas of sun damage on his back as well as face, needing new referral for skin check with dermatology    Elevated hemoglobin A1c  History of slightly elevated A1c.  Watching his diet, active around the house but no regular exercise    Pure hypercholesterolemia  Chronic issue managed with rosuvastatin 10 milligrams daily (taking half 20 mg tablet), due for labs    Injury of clavicle, initial encounter  This is a new concerned for evaluation today.  Patient believes that he may have initially injured himself falling while skiing.  A few months ago he woke up in the morning and suddenly had a lump over the distal clavicle, it seems to flareup at times and cause him more discomfort.  He denies any shoulder joint pain, change in range of motion, activity limitation or decrease in strength.  He is uncertain if he would want to pursue any treatment for this but he would like to get an x-ray    He is due for colonoscopy    Current medicines (including changes today)  Current Outpatient Medications   Medication Sig Dispense Refill   • rosuvastatin (CRESTOR) 20 MG Tab Take 1 Tab by mouth every evening. 90 Tab 1   • lisinopril (PRINIVIL) 10 MG Tab Take 1 Tab by mouth every day. 90 Tab 3   • B Complex Vitamins (VITAMIN B COMPLEX PO) Take 1 Tab by mouth every day. Unknown dose   Indications: Nutritional Support     • ascorbic acid (ASCORBIC ACID) 500 MG TABS Take 500 mg by mouth every day.       No current facility-administered medications for this visit.      He  has a past  "medical history of Bell's palsy ("s), Hyperlipidemia, Hypertension, Sleep apnea, and Snoring.  He  has a past surgical history that includes knee arthroscopy (2013) and medial meniscectomy (2013).  Social History     Tobacco Use   • Smoking status: Never Smoker   • Smokeless tobacco: Never Used   Substance Use Topics   • Alcohol use: Yes     Comment: beer twice weekly, last dose 1 beer 13   • Drug use: No     Social History     Social History Narrative   • Not on file     Family History   Problem Relation Age of Onset   • Stroke Mother    • Hypertension Mother    • Hyperlipidemia Mother    • Heart Disease Father    • Hypertension Brother      Family Status   Relation Name Status   • Mo     • Fa     • Bro  Alive   • Bro  Alive   • Bro  Alive         ROS  Problems listed discussed above, all other systems reviewed and negative     Objective:     /80 (BP Location: Right arm, Patient Position: Sitting, BP Cuff Size: Adult)   Pulse 74   Temp 36.7 °C (98.1 °F) (Temporal)   Resp 16   Ht 1.651 m (5' 5\")   Wt 80.7 kg (178 lb)   SpO2 96%  Body mass index is 29.62 kg/m².  Physical Exam:  General: Alert, oriented in no acute distress.  Eye contact is good, speech is normal, affect calm  HEENT: TMs gray with good landmarks bilaterally. No cervical or supraclavicular lymphadenopathy, thyroid isthmus palpable without masses or nodules.  Lungs: clear to auscultation bilaterally, good aeration, normal effort. No wheeze/ rhonchi/ rales.  CV: regular rate and rhythm, S1, S2. No murmur, no JVD, no edema. Pedal pulses 2 + bilaterally  Abdomen: soft, nontender, BS x4, no hepatosplenomegaly.  Ext: color normal, vascularity normal, temperature normal. No rash or lesions.  MS: Prominence over the right distal clavicle without point tenderness.  No right glenohumeral joint tenderness, full range of motion    Assessment and Plan:   The following treatment plan was discussed  1. Annual physical exam  " Normal physical exam. General health and wellness discussion including healthy diet, regular exercise. 2000 iu Vit d3 advised daily. Preventative health screenings - referrals as listed below. Advised regular dental cleanings, eye exam yearly.     2. Injury of clavicle, initial encounter   bony protrusion over the right distal clavicle, will evaluate x-ray.  Follow-up pending result  DX-CLAVICLE RIGHT   3. Essential hypertension   borderline blood pressure in clinic, advised to take full 10 mg dose of lisinopril daily.  He may report back to me with blood pressure readings   4. Pure hypercholesterolemia   currently taking rosuvastatin 10 mg daily, evaluate labs  Lipid Profile    Comp Metabolic Panel   5. Elevated hemoglobin A1c     6. Elevated fasting glucose  HEMOGLOBIN A1C   7. Screening for prostate cancer  PROSTATE SPECIFIC AG SCREENING   8. Need for hepatitis C screening test  HEP C VIRUS ANTIBODY   9. Actinic keratosis     10. Colon cancer screening  REFERRAL TO GASTROENTEROLOGY   11. Skin cancer screening  REFERRAL TO DERMATOLOGY   12. Need for shingles vaccine  I have placed the below orders and discussed them with an approved delegating provider. The MA is performing the below orders under the direction of Dr. Washburn  Shingles Vaccine (Shingrix)       Followup: pending tests             Please note that this dictation was created using voice recognition software. I have worked with consultants from the vendor as well as technical experts from [employer] to optimize the interface. I have made every reasonable attempt to correct obvious errors, but I expect that there are errors of grammar and possibly content that I did not discover before finalizing the note.

## 2020-11-05 NOTE — ASSESSMENT & PLAN NOTE
Chronic issue managed with rosuvastatin 10 milligrams daily (taking half 20 mg tablet), due for labs

## 2020-11-05 NOTE — ASSESSMENT & PLAN NOTE
History of slightly elevated A1c.  Watching his diet, active around the house but no regular exercise

## 2020-11-05 NOTE — ASSESSMENT & PLAN NOTE
Multiple areas of sun damage on his back as well as face, needing new referral for skin check with dermatology

## 2020-11-05 NOTE — ASSESSMENT & PLAN NOTE
Chronic issue managed with lisinopril 10 mg daily although patient tells me today has only been taking half the dose, no particular reason for this.  Borderline blood pressure in clinic today as well as last year.  No chest pain, dizziness, palpitation

## 2020-11-05 NOTE — ASSESSMENT & PLAN NOTE
This is a new concerned for evaluation today.  Patient believes that he may have initially injured himself falling while skiing.  A few months ago he woke up in the morning and suddenly had a lump over the distal clavicle, it seems to flareup at times and cause him more discomfort.  He denies any shoulder joint pain, change in range of motion, activity limitation or decrease in strength.  He is uncertain if he would want to pursue any treatment for this but he would like to get an x-ray

## 2020-11-20 DIAGNOSIS — E78.00 PURE HYPERCHOLESTEROLEMIA: ICD-10-CM

## 2020-11-20 DIAGNOSIS — I10 ESSENTIAL HYPERTENSION: ICD-10-CM

## 2020-11-21 RX ORDER — LISINOPRIL 10 MG/1
10 TABLET ORAL DAILY
Qty: 90 TAB | Refills: 3 | Status: SHIPPED | OUTPATIENT
Start: 2020-11-21 | End: 2021-12-08 | Stop reason: SDUPTHER

## 2020-11-21 RX ORDER — ROSUVASTATIN CALCIUM 20 MG/1
20 TABLET, COATED ORAL EVERY EVENING
Qty: 90 TAB | Refills: 3 | Status: SHIPPED | OUTPATIENT
Start: 2020-11-21 | End: 2021-12-08 | Stop reason: SDUPTHER

## 2020-11-21 NOTE — TELEPHONE ENCOUNTER
Received request via: Patient    Was the patient seen in the last year in this department? Yes    Does the patient have an active prescription (recently filled or refills available) for medication(s) requested? No     Requested Prescriptions     Pending Prescriptions Disp Refills   • lisinopril (PRINIVIL) 10 MG Tab 90 Tab 3     Sig: Take 1 Tab by mouth every day.   • rosuvastatin (CRESTOR) 20 MG Tab 90 Tab 1     Sig: Take 1 Tab by mouth every evening.

## 2020-12-16 ENCOUNTER — HOSPITAL ENCOUNTER (OUTPATIENT)
Dept: LAB | Facility: MEDICAL CENTER | Age: 60
End: 2020-12-16
Attending: NURSE PRACTITIONER
Payer: COMMERCIAL

## 2020-12-16 DIAGNOSIS — Z11.59 NEED FOR HEPATITIS C SCREENING TEST: ICD-10-CM

## 2020-12-16 DIAGNOSIS — R73.01 ELEVATED FASTING GLUCOSE: ICD-10-CM

## 2020-12-16 DIAGNOSIS — Z12.5 SCREENING FOR PROSTATE CANCER: ICD-10-CM

## 2020-12-16 DIAGNOSIS — E78.00 PURE HYPERCHOLESTEROLEMIA: ICD-10-CM

## 2020-12-16 LAB
ALBUMIN SERPL BCP-MCNC: 4.6 G/DL (ref 3.2–4.9)
ALBUMIN/GLOB SERPL: 1.8 G/DL
ALP SERPL-CCNC: 59 U/L (ref 30–99)
ALT SERPL-CCNC: 48 U/L (ref 2–50)
ANION GAP SERPL CALC-SCNC: 8 MMOL/L (ref 7–16)
AST SERPL-CCNC: 24 U/L (ref 12–45)
BILIRUB SERPL-MCNC: 0.7 MG/DL (ref 0.1–1.5)
BUN SERPL-MCNC: 16 MG/DL (ref 8–22)
CALCIUM SERPL-MCNC: 9.6 MG/DL (ref 8.5–10.5)
CHLORIDE SERPL-SCNC: 100 MMOL/L (ref 96–112)
CHOLEST SERPL-MCNC: 165 MG/DL (ref 100–199)
CO2 SERPL-SCNC: 29 MMOL/L (ref 20–33)
CREAT SERPL-MCNC: 0.89 MG/DL (ref 0.5–1.4)
EST. AVERAGE GLUCOSE BLD GHB EST-MCNC: 117 MG/DL
FASTING STATUS PATIENT QL REPORTED: NORMAL
GLOBULIN SER CALC-MCNC: 2.5 G/DL (ref 1.9–3.5)
GLUCOSE SERPL-MCNC: 94 MG/DL (ref 65–99)
HBA1C MFR BLD: 5.7 % (ref 0–5.6)
HCV AB SER QL: NORMAL
HDLC SERPL-MCNC: 54 MG/DL
LDLC SERPL CALC-MCNC: 93 MG/DL
POTASSIUM SERPL-SCNC: 4.1 MMOL/L (ref 3.6–5.5)
PROT SERPL-MCNC: 7.1 G/DL (ref 6–8.2)
PSA SERPL-MCNC: 2.23 NG/ML (ref 0–4)
SODIUM SERPL-SCNC: 137 MMOL/L (ref 135–145)
TRIGL SERPL-MCNC: 91 MG/DL (ref 0–149)

## 2020-12-16 PROCEDURE — 84153 ASSAY OF PSA TOTAL: CPT

## 2020-12-16 PROCEDURE — 80061 LIPID PANEL: CPT

## 2020-12-16 PROCEDURE — 80053 COMPREHEN METABOLIC PANEL: CPT

## 2020-12-16 PROCEDURE — 83036 HEMOGLOBIN GLYCOSYLATED A1C: CPT

## 2020-12-16 PROCEDURE — 86803 HEPATITIS C AB TEST: CPT

## 2020-12-16 PROCEDURE — 36415 COLL VENOUS BLD VENIPUNCTURE: CPT

## 2021-01-26 ENCOUNTER — APPOINTMENT (RX ONLY)
Dept: URBAN - METROPOLITAN AREA CLINIC 4 | Facility: CLINIC | Age: 61
Setting detail: DERMATOLOGY
End: 2021-01-26

## 2021-01-26 DIAGNOSIS — D22 MELANOCYTIC NEVI: ICD-10-CM

## 2021-01-26 DIAGNOSIS — L82.1 OTHER SEBORRHEIC KERATOSIS: ICD-10-CM

## 2021-01-26 DIAGNOSIS — L81.4 OTHER MELANIN HYPERPIGMENTATION: ICD-10-CM

## 2021-01-26 PROBLEM — D22.5 MELANOCYTIC NEVI OF TRUNK: Status: ACTIVE | Noted: 2021-01-26

## 2021-01-26 PROBLEM — D22.62 MELANOCYTIC NEVI OF LEFT UPPER LIMB, INCLUDING SHOULDER: Status: ACTIVE | Noted: 2021-01-26

## 2021-01-26 PROCEDURE — ? COUNSELING

## 2021-01-26 PROCEDURE — 99203 OFFICE O/P NEW LOW 30 MIN: CPT

## 2021-01-26 ASSESSMENT — LOCATION DETAILED DESCRIPTION DERM
LOCATION DETAILED: LEFT VENTRAL PROXIMAL FOREARM
LOCATION DETAILED: STERNUM
LOCATION DETAILED: RIGHT SUPERIOR UPPER BACK
LOCATION DETAILED: LEFT DISTAL PRETIBIAL REGION

## 2021-01-26 ASSESSMENT — LOCATION ZONE DERM
LOCATION ZONE: ARM
LOCATION ZONE: LEG
LOCATION ZONE: TRUNK

## 2021-01-26 ASSESSMENT — LOCATION SIMPLE DESCRIPTION DERM
LOCATION SIMPLE: CHEST
LOCATION SIMPLE: LEFT FOREARM
LOCATION SIMPLE: RIGHT UPPER BACK
LOCATION SIMPLE: LEFT PRETIBIAL REGION

## 2021-02-03 ENCOUNTER — NON-PROVIDER VISIT (OUTPATIENT)
Dept: MEDICAL GROUP | Facility: MEDICAL CENTER | Age: 61
End: 2021-02-03
Payer: COMMERCIAL

## 2021-02-03 DIAGNOSIS — Z23 NEED FOR VACCINATION: ICD-10-CM

## 2021-02-03 PROCEDURE — 90471 IMMUNIZATION ADMIN: CPT | Performed by: NURSE PRACTITIONER

## 2021-02-03 PROCEDURE — 90750 HZV VACC RECOMBINANT IM: CPT | Performed by: NURSE PRACTITIONER

## 2021-02-03 NOTE — NON-PROVIDER
"Camron Mcmahon is a 60 y.o. male here for a non-provider visit for:   SHINGRIX (Shingles)    Reason for immunization: continue or complete series started at the office  Immunization records indicate need for vaccine: Yes, confirmed with Epic  Minimum interval has been met for this vaccine: Yes  ABN completed: Yes    Order and dose verified by: corrine RICHARDSON Dated  10/30/19 was given to patient: Yes  All IAC Questionnaire questions were answered \"No.\"    Patient tolerated injection and no adverse effects were observed or reported: Yes    Pt scheduled for next dose in series: No  "

## 2021-03-15 DIAGNOSIS — Z23 NEED FOR VACCINATION: ICD-10-CM

## 2021-03-24 ENCOUNTER — IMMUNIZATION (OUTPATIENT)
Dept: FAMILY PLANNING/WOMEN'S HEALTH CLINIC | Facility: IMMUNIZATION CENTER | Age: 61
End: 2021-03-24
Attending: INTERNAL MEDICINE
Payer: COMMERCIAL

## 2021-03-24 DIAGNOSIS — Z23 NEED FOR VACCINATION: ICD-10-CM

## 2021-03-24 DIAGNOSIS — Z23 ENCOUNTER FOR VACCINATION: Primary | ICD-10-CM

## 2021-03-24 PROCEDURE — 0001A PFIZER SARS-COV-2 VACCINE: CPT

## 2021-03-24 PROCEDURE — 91300 PFIZER SARS-COV-2 VACCINE: CPT

## 2021-04-15 ENCOUNTER — IMMUNIZATION (OUTPATIENT)
Dept: FAMILY PLANNING/WOMEN'S HEALTH CLINIC | Facility: IMMUNIZATION CENTER | Age: 61
End: 2021-04-15
Attending: INTERNAL MEDICINE
Payer: COMMERCIAL

## 2021-04-15 DIAGNOSIS — Z23 ENCOUNTER FOR VACCINATION: Primary | ICD-10-CM

## 2021-04-15 PROCEDURE — 91300 PFIZER SARS-COV-2 VACCINE: CPT

## 2021-04-15 PROCEDURE — 0002A PFIZER SARS-COV-2 VACCINE: CPT

## 2021-07-29 PROBLEM — S83.232A COMPLEX TEAR OF MEDIAL MENISCUS OF LEFT KNEE: Status: ACTIVE | Noted: 2021-07-29

## 2021-08-06 ENCOUNTER — TELEPHONE (OUTPATIENT)
Dept: MEDICAL GROUP | Facility: MEDICAL CENTER | Age: 61
End: 2021-08-06

## 2021-08-06 DIAGNOSIS — I49.8 OTHER CARDIAC ARRHYTHMIA: ICD-10-CM

## 2021-08-06 NOTE — TELEPHONE ENCOUNTER
PT ASSISTED BACK TO BED AFTER WORKING WITH PHYSICAL THERAPY. PT REPORTS PAIN
IS WELL MANAGED AT THIS TIME. SHE HAS HER GOING HOME CLOTHS ON AND REPORTS SHE
IS READY TO GO HOME. PT REQUESTING REFERRAL TO CARDIOLOGIST.   EKG WAS DONE FOR PRE-OP AT [facility] AND SHOWS AN ARRYTHMIA AND ORTHO REQUESTING FURTHER EVALUATION.     LOV: 11/2020

## 2021-08-31 ENCOUNTER — OFFICE VISIT (OUTPATIENT)
Dept: CARDIOLOGY | Facility: MEDICAL CENTER | Age: 61
End: 2021-08-31
Payer: COMMERCIAL

## 2021-08-31 VITALS
DIASTOLIC BLOOD PRESSURE: 82 MMHG | BODY MASS INDEX: 29.06 KG/M2 | SYSTOLIC BLOOD PRESSURE: 120 MMHG | HEIGHT: 66 IN | RESPIRATION RATE: 14 BRPM | HEART RATE: 74 BPM | WEIGHT: 180.8 LBS | OXYGEN SATURATION: 96 %

## 2021-08-31 DIAGNOSIS — I10 ESSENTIAL HYPERTENSION: ICD-10-CM

## 2021-08-31 DIAGNOSIS — Z01.810 PREOPERATIVE CARDIOVASCULAR EXAMINATION: ICD-10-CM

## 2021-08-31 DIAGNOSIS — I49.1 ATRIAL PREMATURE COMPLEXES: ICD-10-CM

## 2021-08-31 PROCEDURE — 99203 OFFICE O/P NEW LOW 30 MIN: CPT | Performed by: INTERNAL MEDICINE

## 2021-08-31 PROCEDURE — 93000 ELECTROCARDIOGRAM COMPLETE: CPT | Performed by: INTERNAL MEDICINE

## 2021-08-31 NOTE — PROGRESS NOTES
"Arrhythmia Clinic Note (New Patient)    DOS: 8/31/2021    Referring physician: Mamie Lugo    Chief complaint/Reason for consult: Abnormal EKG, pre-operative assessment    HPI:  Patient is a 60 yo M. History of sleep apnea, HTN, and prior meniscus surgery. He works at the [employer] at the [employer]. He tore his other meniscus and was undergoing evaluation for meniscus surgery. Pre-operative EKG showed a PAC and one EKG showed a PVC. He is asymptomatic. This was concerned both the surgeon and his PCP and he was referred for evaluation and is here for such in the electrophysiology clinic. Today his rhythm is normal sinus. He describes normal exertional capacity. Denies any SOB/CP with exercise and can well perform > 4 METS of activity based on history without difficulty.    ROS (+in BOLD):  Constitutional: Fevers/chills/fatigue/weightloss  HEENT: Blurry vision/eye pain/sore throat/hearing loss  Respiratory: Shortness of breath/cough  Cardiovascular: Chest pain/palpitations/edema/orthopnea/syncope  GI: Nausea/vomitting/diarrhea  MSK: Arthralgias/myagias/muscle weakness  Skin: Rash/sores  Neurological: Numbness/tremors/vertigo  Endocrine: Excessive thirst/polyuria/cold intolerance/heat intolerance  Psych: Depression/anxiety      Past Medical History:   Diagnosis Date   • Bell's palsy 1980\"s    right face-twitches sometimes still   • Hyperlipidemia    • Hypertension    • Sleep apnea     cpap #12 pressure, no o2   • Snoring        Past Surgical History:   Procedure Laterality Date   • KNEE ARTHROSCOPY  5/9/2013    Performed by Brandan De M.D. at [facility]   • MEDIAL MENISCECTOMY  5/9/2013    Performed by Brandan De M.D. at [facility]       Social History     Socioeconomic History   • Marital status:      Spouse name: Not on file   • Number of children: Not on file   • Years of education: Not on file   • Highest education level: Not on file   Occupational History   • Not on file "   Tobacco Use   • Smoking status: Never Smoker   • Smokeless tobacco: Never Used   Vaping Use   • Vaping Use: Never used   Substance and Sexual Activity   • Alcohol use: Yes     Comment: beer twice weekly, last dose 1 beer 5/8/13   • Drug use: No   • Sexual activity: Not on file   Other Topics Concern   • Not on file   Social History Narrative   • Not on file     Social Determinants of Health     Financial Resource Strain:    • Difficulty of Paying Living Expenses:    Food Insecurity:    • Worried About Running Out of Food in the Last Year:    • Ran Out of Food in the Last Year:    Transportation Needs:    • Lack of Transportation (Medical):    • Lack of Transportation (Non-Medical):    Physical Activity:    • Days of Exercise per Week:    • Minutes of Exercise per Session:    Stress:    • Feeling of Stress :    Social Connections:    • Frequency of Communication with Friends and Family:    • Frequency of Social Gatherings with Friends and Family:    • Attends Confucianist Services:    • Active Member of Clubs or Organizations:    • Attends Club or Organization Meetings:    • Marital Status:    Intimate Partner Violence:    • Fear of Current or Ex-Partner:    • Emotionally Abused:    • Physically Abused:    • Sexually Abused:        Family History   Problem Relation Age of Onset   • Stroke Mother    • Hypertension Mother    • Hyperlipidemia Mother    • Heart Disease Father    • Hypertension Brother        No Known Allergies    Current Outpatient Medications   Medication Sig Dispense Refill   • aspirin (ASA) 81 MG Chew Tab chewable tablet Chew 81 mg every day.     • lisinopril (PRINIVIL) 10 MG Tab Take 1 Tab by mouth every day. 90 Tab 3   • rosuvastatin (CRESTOR) 20 MG Tab Take 1 Tab by mouth every evening. 90 Tab 3     No current facility-administered medications for this visit.       Physical Exam:  Vitals:    08/31/21 0853   BP: 120/82   BP Location: Left arm   Patient Position: Sitting   BP Cuff Size: Adult   Pulse:  "74   Resp: 14   SpO2: 96%   Weight: 82 kg (180 lb 12.8 oz)   Height: 1.676 m (5' 6\")     General appearance: NAD, conversant  Eyes: anicteric sclerae, no lid-lag; PERRLA  HENT: Atraumatic; moist mucous membranes, no ulcerations  Neck: Trachea midline; FROM, supple, no thyromegaly  Lungs: CTA, with normal respiratory effort and no intercostal retractions  CV: RRR, no MRGs, no JVD  Abdomen: Soft, non-tender; normal bowel sounds, no HSM  Extremities: No peripheral edema. No clubbing or cyanosis.  Skin: Normal temperature, turgor and texture; no rash or ulcers  Psych: Appropriate affect, alert and oriented to person, place and time      Data:  Labs reviewed    EKG interpreted by me:  NSR    Prior EKGs reviewed, one showing PAC, another PVC vs PAC with aberrancy    Impression/Plan:  1. Atrial premature complexes     2. Essential hypertension     3. Preoperative cardiovascular examination       -Asymptomatic occasional ectopy on EKG, I do not think anything has to be done  -He has normal exertional capacity and I would recommend proceeding with surgery without further cardiac work up  -F/u PRN    Ronald Cool MD        "

## 2021-09-16 LAB — EKG IMPRESSION: NORMAL

## 2021-09-22 ENCOUNTER — HOSPITAL ENCOUNTER (OUTPATIENT)
Facility: MEDICAL CENTER | Age: 61
End: 2021-09-22
Attending: ANESTHESIOLOGY
Payer: COMMERCIAL

## 2021-09-22 LAB
COVID ORDER STATUS COVID19: NORMAL
SARS-COV-2 RNA RESP QL NAA+PROBE: NOTDETECTED
SPECIMEN SOURCE: NORMAL

## 2021-09-22 PROCEDURE — U0005 INFEC AGEN DETEC AMPLI PROBE: HCPCS

## 2021-09-22 PROCEDURE — U0003 INFECTIOUS AGENT DETECTION BY NUCLEIC ACID (DNA OR RNA); SEVERE ACUTE RESPIRATORY SYNDROME CORONAVIRUS 2 (SARS-COV-2) (CORONAVIRUS DISEASE [COVID-19]), AMPLIFIED PROBE TECHNIQUE, MAKING USE OF HIGH THROUGHPUT TECHNOLOGIES AS DESCRIBED BY CMS-2020-01-R: HCPCS

## 2021-12-04 PROBLEM — E66.3 OVERWEIGHT (BMI 25.0-29.9): Status: ACTIVE | Noted: 2021-12-04

## 2021-12-04 PROBLEM — R73.03 PREDIABETES: Status: ACTIVE | Noted: 2019-09-25

## 2021-12-05 NOTE — PROGRESS NOTES
Subjective:     CC:  Diagnoses of Essential hypertension, Prediabetes, Pure hypercholesterolemia, Overweight (BMI 25.0-29.9), and Prostate cancer screening were pertinent to this visit.    HISTORY OF THE PRESENT ILLNESS: Patient is a 61 y.o. male. This pleasant patient is here today to establish care and discuss chronic conditions. His prior PCP was FRANDY Aj.    He had recent left knee partial medial meniscectomy 09/2021 and recovered well.    Problem   Overweight (Bmi 25.0-29.9)    Chronic condition. He tries to eat healthy in general. No sodas. He does not regularly exercise. He is otherwise active at work and around the house, [employer] projects.     Pure Hypercholesterolemia    Chronic condition. Has been on statin since 2019. Current regimen: rosuvastatin 20mg daily. He reports compliance and tolerating medication well. Denies musculoskeletal pain, headache, diarrhea. He does not need medication refill today. No acute concerns at this time.       Essential Hypertension    Chronic condition. Has been taking lisinopril for several years.  Current medication regimen: lisinopril 10mg daily  Patient tolerating and reports compliant with medications. He does not regularly monitor blood pressure at home. Does not need refill of medications today. Denies headache, dizziness, vision changes, chest pain, dyspnea, edema.       Prediabetes    Chronic condition. A1C has been stable. He tries to eat healthy in general. He does not regularly exercise. He is otherwise active at work and around the house.         Current Outpatient Medications Ordered in Epic   Medication Sig Dispense Refill   • lisinopril (PRINIVIL) 10 MG Tab Take 1 Tablet by mouth every day. 90 Tablet 3   • rosuvastatin (CRESTOR) 20 MG Tab Take 1 Tablet by mouth every evening. 90 Tablet 3   • aspirin (ASA) 81 MG Chew Tab chewable tablet Chew 81 mg every day.       No current [facility]-ordered facility-administered medications on file.     SH: works as  " at the [employer], , blended family with children ranging in age from 9 years old to 27, he likes to ski, never smoker, occasional 2-3 beers on days off, denies illicit drugs    Health Maintenance: reviewed and discussed with patient  Vaccines: flu received 10/2021, Shingrix completed 02/2021, Tdap received 05/2016, Pfizer #3 received 11/2021  Colonoscopy 03/2021 diverticulosis, next due 03/2026 due to prior h/o colonic polyps    ROS:   Gen: no fevers/chills, no changes in weight  Eyes: no changes in vision  ENT: no sore throat, no hearing loss, no bloody nose  Pulm: no sob, no cough  CV: no chest pain, no palpitations  GI: no nausea/vomiting, no diarrhea  : no dysuria  MSk: no myalgias  Skin: no rash  Neuro: no headaches, no numbness/tingling  Heme/Lymph: no easy bruising      Objective:     Exam: /78 (BP Location: Left arm, Patient Position: Sitting, BP Cuff Size: Adult)   Pulse 69   Temp 37.2 °C (98.9 °F) (Temporal)   Resp 14   Ht 1.702 m (5' 7\")   Wt 82 kg (180 lb 12.4 oz)   SpO2 95%  Body mass index is 28.31 kg/m².    General: Normal appearing. No distress.  HEENT: Normocephalic. Eyes conjunctiva clear lids without ptosis, ears normal shape and contour, canals are clear bilaterally, tympanic membranes are benign, nasal mucosa benign, oropharynx is without erythema, edema or exudates.   Neck: Supple without JVD or bruit. Thyroid is not enlarged.  Pulmonary: Clear to ausculation.  Normal effort. No rales, ronchi, or wheezing.  Cardiovascular: Regular rate and rhythm without murmur. Carotid and radial pulses are intact and equal bilaterally.  Abdomen: Soft, nontender, nondistended. Normal bowel sounds.  Neurologic: Grossly nonfocal  Skin: Warm and dry.  No obvious lesions.  Musculoskeletal: Normal gait. No extremity cyanosis, clubbing, or edema.  Psych: Normal mood and affect. Alert and oriented x3. Judgment and insight is normal.    Labs:   Lab Results   Component Value Date/Time "    SODIUM 137 12/16/2020 08:41 AM    POTASSIUM 4.1 12/16/2020 08:41 AM    CHLORIDE 100 12/16/2020 08:41 AM    CO2 29 12/16/2020 08:41 AM    ANION 8.0 12/16/2020 08:41 AM    GLUCOSE 94 12/16/2020 08:41 AM    BUN 16 12/16/2020 08:41 AM    CREATININE 0.89 12/16/2020 08:41 AM    CALCIUM 9.6 12/16/2020 08:41 AM    ASTSGOT 24 12/16/2020 08:41 AM    ALTSGPT 48 12/16/2020 08:41 AM    TBILIRUBIN 0.7 12/16/2020 08:41 AM    ALBUMIN 4.6 12/16/2020 08:41 AM    TOTPROTEIN 7.1 12/16/2020 08:41 AM    GLOBULIN 2.5 12/16/2020 08:41 AM    AGRATIO 1.8 12/16/2020 08:41 AM     Lab Results   Component Value Date/Time    HBA1C 5.7 (H) 12/16/2020 08:41 AM      Lab Results   Component Value Date/Time    CHOLSTRLTOT 165 12/16/2020 0841    TRIGLYCERIDE 91 12/16/2020 0841    HDL 54 12/16/2020 0841    LDL 93 12/16/2020 0841     Prostatic Specific Antigen Tot (ng/mL)   Date Value   12/16/2020 2.23   09/27/2019 2.44   05/11/2016 1.15     Current 10-yr ASCVD risk 9.3% based on labs from 12/2020    Assessment & Plan:   61 y.o. male with the following -    Problem List Items Addressed This Visit     Pure hypercholesterolemia  Chronic condition, controlled with medications.  - advised to maintain a healthy weight, regular aerobic exercise, and eating diets lower in saturated fats  - cont current regimen: rosuvastatin 20mg qhs    Relevant Medications    lisinopril (PRINIVIL) 10 MG Tab    rosuvastatin (CRESTOR) 20 MG Tab    Other Relevant Orders    Lipid Profile    Essential hypertension  Chronic condition, controlled with medications. Blood pressure at goal of < 140/90.  - cont current medication: lisinopril 10mg daily  - encouraged home blood pressure monitoring using an appropriately fitting upper-arm cuff on a bare arm, emptying the bladder, avoiding caffeinated beverages for 30 minutes before taking the measurement, resting for five minutes before taking the measurement, keeping the feet on the floor uncrossed and the arm supported with the cuff at  heart level, and not talking during the reading, bring in home blood pressure monitoring device with readings to next follow up visit  - encouraged dietary changes include salt reduction, moderation of alcohol consumption, and a diet high in vegetables and fruit that is low in added sugars and saturated fats (e.g., DASH diet); aerobic and resistance exercises for at least 30 minutes or more at least 3-5 days per week; smoking cessation and stress reduction    Relevant Medications    lisinopril (PRINIVIL) 10 MG Tab    rosuvastatin (CRESTOR) 20 MG Tab    Other Relevant Orders    Comp Metabolic Panel    Prediabetes  Chronic condition, stable. A1C 5.7. ASCVD risk 9.3%.  - discussed about lifestyle modifications including weight loss of about 5-10%, 30 minutes of moderate-intensity physical activity 3-5x/week, and healthy diet (eat foods such as vegetables, fruits, whole grains, lean proteins such as fish or chicken, and low-fat dairy; avoid processed, fried, or sugary foods; drink water instead of sweetened drinks)     Relevant Orders    HEMOGLOBIN A1C    Overweight (BMI 25.0-29.9)  Body mass index is 28.31 kg/m².  - f/u CMP, HbA1C, lipid panel  - discussed lifestyle modifications including weight loss (lose 5-10% of current body weight, no more than 2 pounds per week), healthy diet (eat regular meals with a variety of food, limit daily intake of saturated fat and sodium, limit high-sugar foods, avoid sodas and alcohol, and stay physically active (at least 30 minutes of moderate-intensity physical activity 3-5x/week)     Relevant Orders    Comp Metabolic Panel    HEMOGLOBIN A1C    Lipid Profile      Other Visit Diagnoses     Prostate cancer screening   - based on shared decision-making, we will check PSA for screening every 2 years          Return in about 1 year (around 12/8/2022) for annual physical.    Please note that this dictation was created using voice recognition software. I have made every reasonable attempt  to correct obvious errors, but I expect that there are errors of grammar and possibly content that I did not discover before finalizing the note.

## 2021-12-08 ENCOUNTER — OFFICE VISIT (OUTPATIENT)
Dept: MEDICAL GROUP | Facility: MEDICAL CENTER | Age: 61
End: 2021-12-08
Payer: COMMERCIAL

## 2021-12-08 VITALS
RESPIRATION RATE: 14 BRPM | HEIGHT: 67 IN | HEART RATE: 69 BPM | TEMPERATURE: 98.9 F | DIASTOLIC BLOOD PRESSURE: 78 MMHG | OXYGEN SATURATION: 95 % | SYSTOLIC BLOOD PRESSURE: 126 MMHG | BODY MASS INDEX: 28.37 KG/M2 | WEIGHT: 180.78 LBS

## 2021-12-08 DIAGNOSIS — E66.3 OVERWEIGHT (BMI 25.0-29.9): ICD-10-CM

## 2021-12-08 DIAGNOSIS — Z12.5 PROSTATE CANCER SCREENING: ICD-10-CM

## 2021-12-08 DIAGNOSIS — E78.00 PURE HYPERCHOLESTEROLEMIA: ICD-10-CM

## 2021-12-08 DIAGNOSIS — R73.03 PREDIABETES: ICD-10-CM

## 2021-12-08 DIAGNOSIS — I10 ESSENTIAL HYPERTENSION: ICD-10-CM

## 2021-12-08 PROBLEM — G47.30 SLEEP APNEA: Status: ACTIVE | Noted: 2021-12-08

## 2021-12-08 PROCEDURE — 99214 OFFICE O/P EST MOD 30 MIN: CPT | Performed by: STUDENT IN AN ORGANIZED HEALTH CARE EDUCATION/TRAINING PROGRAM

## 2021-12-08 RX ORDER — LISINOPRIL 10 MG/1
10 TABLET ORAL DAILY
Qty: 90 TABLET | Refills: 3 | Status: SHIPPED | OUTPATIENT
Start: 2021-12-08 | End: 2022-12-05

## 2021-12-08 RX ORDER — ROSUVASTATIN CALCIUM 20 MG/1
20 TABLET, COATED ORAL EVERY EVENING
Qty: 90 TABLET | Refills: 3 | Status: SHIPPED | OUTPATIENT
Start: 2021-12-08 | End: 2022-12-05

## 2021-12-08 ASSESSMENT — PATIENT HEALTH QUESTIONNAIRE - PHQ9: CLINICAL INTERPRETATION OF PHQ2 SCORE: 0

## 2021-12-08 NOTE — LETTER
[facility]  Thomas Montague D.O.  [address]  [place] [address]  Fax: [phone]   Authorization for Release/Disclosure of   Protected Health Information   Name: SIMONA DELANEY : 1960 SSN: [id number]   Address: [address] [place], [place] [address]   [place] [address] Phone:    [phone] (home)    I authorize the entity listed below to release/disclose the PHI below to:   [facility]/Thomas Montague D.O. and Thomas Montague D.O.   Provider or Entity Name:  [facility] Dr. Wellington Gibbs   Address   [facility], [facility], [facility]   Phone:      Fax:     Reason for request: continuity of care   Information to be released:    [XX  ] LAST COLONOSCOPY,  including any PATH REPORT and follow-up  [  ] LAST FIT/COLOGUARD RESULT [  ] LAST DEXA  [  ] LAST MAMMOGRAM  [  ] LAST PAP  [  ] LAST LABS [  ] RETINA EXAM REPORT  [  ] IMMUNIZATION RECORDS  [  ] Release all info      [  ] Check here and initial the line next to each item to release ALL health information INCLUDING  _____ Care and treatment for drug and / or alcohol abuse  _____ HIV testing, infection status, or AIDS  _____ Genetic Testing    DATES OF SERVICE OR TIME PERIOD TO BE DISCLOSED: _____________  I understand and acknowledge that:  * This Authorization may be revoked at any time by you in writing, except if your health information has already been used or disclosed.  * Your health information that will be used or disclosed as a result of you signing this authorization could be re-disclosed by the recipient. If this occurs, your re-disclosed health information may no longer be protected by State or Federal laws.  * You may refuse to sign this Authorization. Your refusal will not affect your ability to obtain treatment.  * This Authorization becomes effective upon signing and will  on (date) __________.      If no date is indicated, this Authorization will  one (1) year from the signature date.    Name: Simona Delaney    Signature:   Date:     2021        PLEASE FAX REQUESTED RECORDS BACK TO: [phone]

## 2021-12-09 PROBLEM — Z86.0100 PERSONAL HISTORY OF COLONIC POLYPS: Status: ACTIVE | Noted: 2021-12-09

## 2021-12-09 PROBLEM — Z86.010 PERSONAL HISTORY OF COLONIC POLYPS: Status: ACTIVE | Noted: 2021-12-09

## 2022-12-09 ENCOUNTER — OFFICE VISIT (OUTPATIENT)
Dept: MEDICAL GROUP | Facility: MEDICAL CENTER | Age: 62
End: 2022-12-09
Payer: COMMERCIAL

## 2022-12-09 VITALS
HEIGHT: 67 IN | WEIGHT: 185 LBS | DIASTOLIC BLOOD PRESSURE: 68 MMHG | SYSTOLIC BLOOD PRESSURE: 112 MMHG | RESPIRATION RATE: 16 BRPM | HEART RATE: 61 BPM | BODY MASS INDEX: 29.03 KG/M2 | TEMPERATURE: 98 F | OXYGEN SATURATION: 97 %

## 2022-12-09 DIAGNOSIS — Z00.00 PREVENTATIVE HEALTH CARE: ICD-10-CM

## 2022-12-09 DIAGNOSIS — E66.3 OVERWEIGHT (BMI 25.0-29.9): ICD-10-CM

## 2022-12-09 DIAGNOSIS — Z11.4 SCREENING FOR HIV (HUMAN IMMUNODEFICIENCY VIRUS): ICD-10-CM

## 2022-12-09 DIAGNOSIS — I10 ESSENTIAL HYPERTENSION: ICD-10-CM

## 2022-12-09 DIAGNOSIS — R73.03 PREDIABETES: ICD-10-CM

## 2022-12-09 DIAGNOSIS — E78.00 PURE HYPERCHOLESTEROLEMIA: ICD-10-CM

## 2022-12-09 DIAGNOSIS — N52.9 ERECTILE DYSFUNCTION, UNSPECIFIED ERECTILE DYSFUNCTION TYPE: ICD-10-CM

## 2022-12-09 PROCEDURE — 99396 PREV VISIT EST AGE 40-64: CPT | Performed by: STUDENT IN AN ORGANIZED HEALTH CARE EDUCATION/TRAINING PROGRAM

## 2022-12-09 RX ORDER — SILDENAFIL 50 MG/1
50 TABLET, FILM COATED ORAL
Qty: 90 TABLET | Refills: 3 | Status: SHIPPED | OUTPATIENT
Start: 2022-12-09 | End: 2023-03-09

## 2022-12-09 ASSESSMENT — PATIENT HEALTH QUESTIONNAIRE - PHQ9: CLINICAL INTERPRETATION OF PHQ2 SCORE: 0

## 2022-12-09 NOTE — PROGRESS NOTES
Subjective:     CC:   Chief Complaint   Patient presents with    Annual Exam       HPI:   Camron Mcmahon is a 62 y.o. male who presents for an annual exam. He is feeling well and has no complaints.    Problem   Preventative Health Care    Patient is here for annual physical today. Feels well overall.       Erectile Dysfunction    Chronic condition.  Current regimen: sildenafil citrate 30mg qhs  He reports compliance and/or tolerance and symptoms controlled with current medication(s). Does need medication(s) refill. No acute concerns.     Overweight (Bmi 25.0-29.9)    Chronic condition. He eats fair in general. No sodas. He does not regularly exercise. He is otherwise active at work and around the house, [employer] projects.     Pure Hypercholesterolemia    Chronic condition. Has been on statin since 2019.   Current regimen: rosuvastatin 20mg daily  He reports compliance and tolerating medication well. Denies musculoskeletal pain, headache, diarrhea. He does not need medication refill today. No acute concerns at this time.       Essential Hypertension    Chronic condition. Has been taking lisinopril for several years.  Current medication regimen: lisinopril 10mg daily  Patient tolerating and reports compliant with medications. He does not regularly monitor blood pressure at home. Does not need refill of medications today. Denies headache, dizziness, vision changes, chest pain, dyspnea, edema.       Prediabetes    Chronic condition. A1C has been stable. He tries to eat healthy in general. He does not regularly exercise. He is otherwise active at work and around the house.         Health Maintenance  Advanced directive: n/a  PT/vit D for falls prevention: n/a  Cholesterol Screening: due  Diabetes Screening: due  AAA Screening: n/a  Aspirin Use: n/a   Diet: eats fair diet in general  Exercise: no regular exercise  Substance Abuse: n/a  Safe in relationship.   Seat belts, bike helmet, gun safety discussed.  Sun  "protection used.    Cancer screening  Colorectal Cancer Screening: done 03/2021, on 5-yr recall   Lung Cancer Screening: n/a  Prostate Cancer Screening/PSA: last done 12/2020    Infectious disease screening/Immunizations  --STI Screening: n/a  --Practices safe sex.  --HIV Screening: due  --Hepatitis C Screening: done 12/2020  --Immunizations:    Influenza: done 12/2022   HPV:  n/a   Tetanus: 05/2016   Shingles: completed 02/2021   Pneumococcal : n/a     COVID-19: Pfizer COVID #4 received 12/2022  Other immunizations: n/a    He  has a past medical history of Bell's palsy (1980\"s), Hyperlipidemia, Hypertension, Sleep apnea, and Snoring.  He  has a past surgical history that includes knee arthroscopy (5/9/2013); meniscectomy, knee, medial (5/9/2013); and meniscectomy, knee, medial (Left, 9/28/2021).  Family History   Problem Relation Age of Onset    Stroke Mother     Hypertension Mother     Hyperlipidemia Mother     Heart Disease Father     Hypertension Brother      Social History     Tobacco Use    Smoking status: Never    Smokeless tobacco: Never   Vaping Use    Vaping Use: Never used   Substance Use Topics    Alcohol use: Yes     Comment: beer twice weekly, last dose 1 beer 5/8/13    Drug use: No       Patient Active Problem List    Diagnosis Date Noted    Preventative health care 12/09/2022    Erectile dysfunction 12/09/2022    Personal history of colonic polyps 12/09/2021    Sleep apnea 12/08/2021    Overweight (BMI 25.0-29.9) 12/04/2021    Complex tear of medial meniscus of left knee 07/29/2021    Injury of clavicle, initial encounter 11/05/2020    Pure hypercholesterolemia 09/25/2019    Essential hypertension 09/25/2019    Actinic keratosis 09/25/2019    Prediabetes 09/25/2019    Osteoarthrosis involving lower leg 05/09/2013       Current Outpatient Medications   Medication Sig Dispense Refill    sildenafil citrate (VIAGRA) 50 MG tablet Take 1 Tablet by mouth 1 time a day as needed for Erectile Dysfunction for " "up to 90 days. 90 Tablet 3    rosuvastatin (CRESTOR) 20 MG Tab TAKE 1 TABLET BY MOUTH EVERY DAY IN THE EVENING 90 Tablet 3    lisinopril (PRINIVIL) 10 MG Tab TAKE 1 TABLET BY MOUTH EVERY DAY 90 Tablet 3     No current facility-administered medications for this visit.    (including changes today)  Allergies: Patient has no known allergies.    Review of Systems   Constitutional: Negative for fever, chills and malaise/fatigue.   HENT: Negative for congestion.    Eyes: Negative for pain.   Respiratory: Negative for cough and shortness of breath.    Cardiovascular: Negative for leg swelling.   Gastrointestinal: Negative for nausea, vomiting, abdominal pain and diarrhea.   Genitourinary: Negative for dysuria and hematuria.   Skin: Negative for rash.   Neurological: Negative for dizziness, focal weakness and headaches.   Endo/Heme/Allergies: Does not bleed easily.   Psychiatric/Behavioral: Negative for depression.  The patient is not nervous/anxious.      Objective:     /68 (BP Location: Left arm, Patient Position: Sitting, BP Cuff Size: Adult)   Pulse 61   Temp 36.7 °C (98 °F) (Temporal)   Resp 16   Ht 1.702 m (5' 7\")   Wt 83.9 kg (185 lb)   SpO2 97%   BMI 28.98 kg/m²   Body mass index is 28.98 kg/m².  Wt Readings from Last 4 Encounters:   12/09/22 83.9 kg (185 lb)   12/08/21 82 kg (180 lb 12.4 oz)   09/28/21 80 kg (176 lb 5.9 oz)   08/31/21 82 kg (180 lb 12.8 oz)       Physical Exam:  Constitutional: Well-developed and well-nourished. Not diaphoretic. No distress.   Skin: Skin is warm and dry. No rash noted.  Head: Atraumatic without lesions.  Eyes: Conjunctivae and extraocular motions are normal. Pupils are equal, round, and reactive to light. No scleral icterus.   Ears:  External ears unremarkable. Tympanic membranes clear and intact.  Nose: Nares patent. Septum midline. Turbinates without erythema nor edema. No discharge.   Mouth/Throat: Dentition is good. Tongue normal. Oropharynx is clear and moist. " Posterior pharynx without erythema or exudates.  Neck: Supple, trachea midline. Normal range of motion. No thyromegaly present. No lymphadenopathy--cervical or supraclavicular.  Cardiovascular: Regular rate and rhythm, S1 and S2 without murmur, rubs, or gallops.    Lungs: Effort normal. Clear to auscultation throughout. No adventitious sounds. No CVA tenderness.  Abdomen: Soft, non tender, and without distention. Active bowel sounds in all four quadrants. No rebound, guarding, masses or HSM.  : deferred  Rectal: deferred  Prostate: deferred  Extremities: No cyanosis, clubbing, erythema, nor edema. Distal pulses intact and symmetric.   Musculoskeletal: All major joints AROM full in all directions without pain.  Neurological: Alert and oriented x 3. DTRs 2+/3 and symmetric. No cranial nerve deficit. 5/5 myotomes. Sensation intact.  Psychiatric:  Behavior, mood, and affect are appropriate.    Assessment and Plan:     1. Preventative health care  - Recommended age appropriate vaccinations and cancer screening  - Labs per orders.  - Vaccinations per orders.  - Counseling about diet, supplements, exercise, skin care.  - Follow up 1 year for annual physical  HEP B SURFACE AB    Comp Metabolic Panel    HEMOGLOBIN A1C    Lipid Profile    TSH WITH REFLEX TO FT4    HIV AG/AB COMBO ASSAY SCREENING      2. Essential hypertension  Chronic condition, stable.  - cont current regimen: lisinopril 10mg daily  Comp Metabolic Panel      3. Pure hypercholesterolemia  Chronic condition, stable.  - cont current regimen: rosuvastatin 20mg daily  Lipid Profile    TSH WITH REFLEX TO FT4      4. Prediabetes  Chronic condition. Stable with healthy lifestyle management.  HEMOGLOBIN A1C      5. Erectile dysfunction, unspecified erectile dysfunction type  Chronic condition, stable. He was previously taking 30mg. We will switch to 50mg per order.  - cont current regimen: sildenafil per order  sildenafil citrate (VIAGRA) 50 MG tablet      6.  Overweight (BMI 25.0-29.9)  - Advised healthy diet/weight loss, regular exercise  Comp Metabolic Panel    HEMOGLOBIN A1C    Lipid Profile    TSH WITH REFLEX TO FT4      7. Screening for HIV (human immunodeficiency virus)  HIV AG/AB COMBO ASSAY SCREENING          HCM:  Labs per orders.  Vaccinations per orders.  Counseling about diet, supplements, exercise, skin care.    Follow-up: Return in about 1 year (around 12/9/2023) for annual physical.

## 2022-12-14 ENCOUNTER — HOSPITAL ENCOUNTER (OUTPATIENT)
Dept: LAB | Facility: MEDICAL CENTER | Age: 62
End: 2022-12-14
Attending: STUDENT IN AN ORGANIZED HEALTH CARE EDUCATION/TRAINING PROGRAM
Payer: COMMERCIAL

## 2022-12-14 DIAGNOSIS — Z11.4 SCREENING FOR HIV (HUMAN IMMUNODEFICIENCY VIRUS): ICD-10-CM

## 2022-12-14 DIAGNOSIS — E78.00 PURE HYPERCHOLESTEROLEMIA: ICD-10-CM

## 2022-12-14 DIAGNOSIS — Z00.00 PREVENTATIVE HEALTH CARE: ICD-10-CM

## 2022-12-14 DIAGNOSIS — E66.3 OVERWEIGHT (BMI 25.0-29.9): ICD-10-CM

## 2022-12-14 DIAGNOSIS — I10 ESSENTIAL HYPERTENSION: ICD-10-CM

## 2022-12-14 DIAGNOSIS — R73.03 PREDIABETES: ICD-10-CM

## 2022-12-14 LAB
ALBUMIN SERPL BCP-MCNC: 4.5 G/DL (ref 3.2–4.9)
ALBUMIN/GLOB SERPL: 1.7 G/DL
ALP SERPL-CCNC: 60 U/L (ref 30–99)
ALT SERPL-CCNC: 59 U/L (ref 2–50)
ANION GAP SERPL CALC-SCNC: 9 MMOL/L (ref 7–16)
AST SERPL-CCNC: 26 U/L (ref 12–45)
BILIRUB SERPL-MCNC: 0.7 MG/DL (ref 0.1–1.5)
BUN SERPL-MCNC: 18 MG/DL (ref 8–22)
CALCIUM ALBUM COR SERPL-MCNC: 9.4 MG/DL (ref 8.5–10.5)
CALCIUM SERPL-MCNC: 9.8 MG/DL (ref 8.5–10.5)
CHLORIDE SERPL-SCNC: 105 MMOL/L (ref 96–112)
CHOLEST SERPL-MCNC: 199 MG/DL (ref 100–199)
CO2 SERPL-SCNC: 26 MMOL/L (ref 20–33)
CREAT SERPL-MCNC: 0.98 MG/DL (ref 0.5–1.4)
EST. AVERAGE GLUCOSE BLD GHB EST-MCNC: 134 MG/DL
FASTING STATUS PATIENT QL REPORTED: NORMAL
GFR SERPLBLD CREATININE-BSD FMLA CKD-EPI: 86 ML/MIN/1.73 M 2
GLOBULIN SER CALC-MCNC: 2.6 G/DL (ref 1.9–3.5)
GLUCOSE SERPL-MCNC: 102 MG/DL (ref 65–99)
HBA1C MFR BLD: 6.3 % (ref 4–5.6)
HBV SURFACE AB SERPL IA-ACNC: <3.5 MIU/ML (ref 0–10)
HDLC SERPL-MCNC: 48 MG/DL
HIV 1+2 AB+HIV1 P24 AG SERPL QL IA: NORMAL
LDLC SERPL CALC-MCNC: 128 MG/DL
POTASSIUM SERPL-SCNC: 4.1 MMOL/L (ref 3.6–5.5)
PROT SERPL-MCNC: 7.1 G/DL (ref 6–8.2)
SODIUM SERPL-SCNC: 140 MMOL/L (ref 135–145)
TRIGL SERPL-MCNC: 117 MG/DL (ref 0–149)
TSH SERPL DL<=0.005 MIU/L-ACNC: 2.7 UIU/ML (ref 0.38–5.33)

## 2022-12-14 PROCEDURE — 36415 COLL VENOUS BLD VENIPUNCTURE: CPT

## 2022-12-14 PROCEDURE — 80061 LIPID PANEL: CPT

## 2022-12-14 PROCEDURE — 84443 ASSAY THYROID STIM HORMONE: CPT

## 2022-12-14 PROCEDURE — 83036 HEMOGLOBIN GLYCOSYLATED A1C: CPT

## 2022-12-14 PROCEDURE — 87389 HIV-1 AG W/HIV-1&-2 AB AG IA: CPT

## 2022-12-14 PROCEDURE — 80053 COMPREHEN METABOLIC PANEL: CPT

## 2022-12-14 PROCEDURE — 86706 HEP B SURFACE ANTIBODY: CPT

## 2022-12-17 DIAGNOSIS — E78.00 PURE HYPERCHOLESTEROLEMIA: ICD-10-CM

## 2022-12-17 DIAGNOSIS — R73.03 PREDIABETES: ICD-10-CM

## 2023-06-14 ENCOUNTER — OFFICE VISIT (OUTPATIENT)
Dept: MEDICAL GROUP | Facility: MEDICAL CENTER | Age: 63
End: 2023-06-14
Payer: COMMERCIAL

## 2023-06-14 VITALS
WEIGHT: 182.32 LBS | BODY MASS INDEX: 28.62 KG/M2 | HEART RATE: 60 BPM | SYSTOLIC BLOOD PRESSURE: 112 MMHG | DIASTOLIC BLOOD PRESSURE: 66 MMHG | HEIGHT: 67 IN | TEMPERATURE: 98 F | OXYGEN SATURATION: 95 %

## 2023-06-14 DIAGNOSIS — E78.00 PURE HYPERCHOLESTEROLEMIA: ICD-10-CM

## 2023-06-14 DIAGNOSIS — E66.3 OVERWEIGHT (BMI 25.0-29.9): ICD-10-CM

## 2023-06-14 DIAGNOSIS — R73.03 PREDIABETES: ICD-10-CM

## 2023-06-14 DIAGNOSIS — I10 ESSENTIAL HYPERTENSION: ICD-10-CM

## 2023-06-14 PROCEDURE — 99214 OFFICE O/P EST MOD 30 MIN: CPT | Performed by: STUDENT IN AN ORGANIZED HEALTH CARE EDUCATION/TRAINING PROGRAM

## 2023-06-14 PROCEDURE — 3074F SYST BP LT 130 MM HG: CPT | Performed by: STUDENT IN AN ORGANIZED HEALTH CARE EDUCATION/TRAINING PROGRAM

## 2023-06-14 PROCEDURE — 3078F DIAST BP <80 MM HG: CPT | Performed by: STUDENT IN AN ORGANIZED HEALTH CARE EDUCATION/TRAINING PROGRAM

## 2023-06-14 RX ORDER — ROSUVASTATIN CALCIUM 20 MG/1
20 TABLET, COATED ORAL EVERY EVENING
Qty: 90 TABLET | Refills: 3 | Status: SHIPPED | OUTPATIENT
Start: 2023-06-14 | End: 2024-01-17 | Stop reason: SDUPTHER

## 2023-06-14 RX ORDER — ASPIRIN 81 MG/1
81 TABLET ORAL DAILY
COMMUNITY
End: 2023-06-14

## 2023-06-14 RX ORDER — LISINOPRIL 10 MG/1
10 TABLET ORAL DAILY
Qty: 90 TABLET | Refills: 3 | Status: SHIPPED | OUTPATIENT
Start: 2023-06-14 | End: 2024-01-17 | Stop reason: SDUPTHER

## 2023-06-14 ASSESSMENT — PATIENT HEALTH QUESTIONNAIRE - PHQ9: CLINICAL INTERPRETATION OF PHQ2 SCORE: 0

## 2023-06-14 NOTE — PROGRESS NOTES
"Subjective:     CC: chronic conditions follow up    HPI:   Camron presents today for chronic conditions follow up    Problem   Overweight (Bmi 25.0-29.9)    Chronic condition. He eats fair in general. No sodas. He does not regularly exercise. 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     Pure Hypercholesterolemia    Chronic condition. Has been on statin since 2019.   Current regimen: rosuvastatin 20mg daily  He reports compliance and tolerating medication well. Denies musculoskeletal pain, headache, diarrhea. He does not need medication refill today. No acute concerns at this time.       Essential Hypertension    Chronic condition. Has been taking lisinopril for several years.  Current medication regimen: lisinopril 10mg daily  Patient tolerating and reports compliant with medications. He does not regularly monitor blood pressure at home. Does not need refill of medications today. Denies headache, dizziness, vision changes, chest pain, dyspnea, edema.       Prediabetes    Chronic condition. A1C has been stable. He tries to eat healthy in general. He does not regularly exercise. He is otherwise active at work and around the house.         Current Outpatient Medications Ordered in Epic   Medication Sig Dispense Refill    lisinopril (PRINIVIL) 10 MG Tab Take 1 Tablet by mouth every day. 90 Tablet 3    rosuvastatin (CRESTOR) 20 MG Tab Take 1 Tablet by mouth every evening. 90 Tablet 3     No current Epic-ordered facility-administered medications on file.     SH: works as  at the [employer], , blended family with children ranging in age from 9 years old to 27, he likes to ski, never smoker, occasional 2-3 beers on days off, denies illicit drugs    ROS:  See HPI    Objective:     Exam:  /66 (BP Location: Left arm, Patient Position: Sitting, BP Cuff Size: Adult)   Pulse 60   Temp 36.7 °C (98 °F) (Temporal)   Ht 1.702 m (5' 7\")   Wt 82.7 kg (182 lb 5.1 oz)   SpO2 " 95%   BMI 28.56 kg/m²  Body mass index is 28.56 kg/m².    Gen: Alert and oriented, No apparent distress.  Neck: Neck is supple without lymphadenopathy.  Lungs: Normal effort, CTA bilaterally, no wheezes, rhonchi, or rales  CV: Regular rate and rhythm. No murmurs, rubs, or gallops.  Ext: No clubbing, cyanosis, edema.    Labs: no new lab results since last visit    Assessment & Plan:     63 y.o. male with the following -     1. Pure hypercholesterolemia  Chronic condition, stable. Plan to reassess and adjust medication as needed.  - cont current regimen: rosuvastatin 20mg qhs  - Lipid Profile; Future  - Lipoprotein (a); Future  - rosuvastatin (CRESTOR) 20 MG Tab; Take 1 Tablet by mouth every evening.  Dispense: 90 Tablet; Refill: 3    2. Essential hypertension  Chronic condition, stable.  - cont current regimen: lisinopril 10mg daily  - Comp Metabolic Panel; Future  - lisinopril (PRINIVIL) 10 MG Tab; Take 1 Tablet by mouth every day.  Dispense: 90 Tablet; Refill: 3    3. Prediabetes  Chronic condition. Plan to reassess.  - HEMOGLOBIN A1C; Future    4. Overweight (BMI 25.0-29.9)  - Comp Metabolic Panel; Future  - HEMOGLOBIN A1C; Future  - Lipid Profile; Future      Return in about 6 months (around 12/14/2023) for chronic medical conditions.    Patient is aware that I will be leaving [facility] at the end of September and that he will need to establish with a new primary care provider. It has been a real privilege serving as his family doctor.    Please note that this dictation was created using voice recognition software. I have made every reasonable attempt to correct obvious errors, but I expect that there are errors of grammar and possibly content that I did not discover before finalizing the note.

## 2023-06-15 ENCOUNTER — HOSPITAL ENCOUNTER (OUTPATIENT)
Dept: LAB | Facility: MEDICAL CENTER | Age: 63
End: 2023-06-15
Attending: STUDENT IN AN ORGANIZED HEALTH CARE EDUCATION/TRAINING PROGRAM
Payer: COMMERCIAL

## 2023-06-15 DIAGNOSIS — E78.00 PURE HYPERCHOLESTEROLEMIA: ICD-10-CM

## 2023-06-15 DIAGNOSIS — I10 ESSENTIAL HYPERTENSION: ICD-10-CM

## 2023-06-15 DIAGNOSIS — R73.03 PREDIABETES: ICD-10-CM

## 2023-06-15 DIAGNOSIS — E66.3 OVERWEIGHT (BMI 25.0-29.9): ICD-10-CM

## 2023-06-15 LAB
ALBUMIN SERPL BCP-MCNC: 4.5 G/DL (ref 3.2–4.9)
ALBUMIN/GLOB SERPL: 2.1 G/DL
ALP SERPL-CCNC: 57 U/L (ref 30–99)
ALT SERPL-CCNC: 49 U/L (ref 2–50)
ANION GAP SERPL CALC-SCNC: 12 MMOL/L (ref 7–16)
AST SERPL-CCNC: 29 U/L (ref 12–45)
BILIRUB SERPL-MCNC: 0.5 MG/DL (ref 0.1–1.5)
BUN SERPL-MCNC: 19 MG/DL (ref 8–22)
CALCIUM ALBUM COR SERPL-MCNC: 9.2 MG/DL (ref 8.5–10.5)
CALCIUM SERPL-MCNC: 9.6 MG/DL (ref 8.5–10.5)
CHLORIDE SERPL-SCNC: 105 MMOL/L (ref 96–112)
CHOLEST SERPL-MCNC: 155 MG/DL (ref 100–199)
CO2 SERPL-SCNC: 23 MMOL/L (ref 20–33)
CREAT SERPL-MCNC: 1 MG/DL (ref 0.5–1.4)
EST. AVERAGE GLUCOSE BLD GHB EST-MCNC: 120 MG/DL
FASTING STATUS PATIENT QL REPORTED: NORMAL
GFR SERPLBLD CREATININE-BSD FMLA CKD-EPI: 84 ML/MIN/1.73 M 2
GLOBULIN SER CALC-MCNC: 2.1 G/DL (ref 1.9–3.5)
GLUCOSE SERPL-MCNC: 105 MG/DL (ref 65–99)
HBA1C MFR BLD: 5.8 % (ref 4–5.6)
HDLC SERPL-MCNC: 47 MG/DL
LDLC SERPL CALC-MCNC: 87 MG/DL
POTASSIUM SERPL-SCNC: 4.5 MMOL/L (ref 3.6–5.5)
PROT SERPL-MCNC: 6.6 G/DL (ref 6–8.2)
SODIUM SERPL-SCNC: 140 MMOL/L (ref 135–145)
TRIGL SERPL-MCNC: 107 MG/DL (ref 0–149)

## 2023-06-15 PROCEDURE — 83695 ASSAY OF LIPOPROTEIN(A): CPT

## 2023-06-15 PROCEDURE — 80061 LIPID PANEL: CPT

## 2023-06-15 PROCEDURE — 83036 HEMOGLOBIN GLYCOSYLATED A1C: CPT

## 2023-06-15 PROCEDURE — 36415 COLL VENOUS BLD VENIPUNCTURE: CPT

## 2023-06-15 PROCEDURE — 80053 COMPREHEN METABOLIC PANEL: CPT

## 2023-06-17 LAB — LPA SERPL-MCNC: <6 MG/DL

## 2023-09-13 ENCOUNTER — DOCUMENTATION (OUTPATIENT)
Dept: HEALTH INFORMATION MANAGEMENT | Facility: OTHER | Age: 63
End: 2023-09-13
Payer: COMMERCIAL

## 2023-12-04 ENCOUNTER — OFFICE VISIT (OUTPATIENT)
Dept: MEDICAL GROUP | Facility: MEDICAL CENTER | Age: 63
End: 2023-12-04
Payer: COMMERCIAL

## 2023-12-04 VITALS
WEIGHT: 185.19 LBS | HEIGHT: 67 IN | DIASTOLIC BLOOD PRESSURE: 84 MMHG | SYSTOLIC BLOOD PRESSURE: 122 MMHG | OXYGEN SATURATION: 97 % | TEMPERATURE: 98.2 F | BODY MASS INDEX: 29.07 KG/M2 | HEART RATE: 81 BPM

## 2023-12-04 DIAGNOSIS — R00.1 SINUS BRADYCARDIA: ICD-10-CM

## 2023-12-04 DIAGNOSIS — E55.9 VITAMIN D DEFICIENCY: ICD-10-CM

## 2023-12-04 DIAGNOSIS — R73.03 PREDIABETES: ICD-10-CM

## 2023-12-04 DIAGNOSIS — I10 ESSENTIAL HYPERTENSION: ICD-10-CM

## 2023-12-04 DIAGNOSIS — E83.42 HYPOMAGNESEMIA: ICD-10-CM

## 2023-12-04 DIAGNOSIS — G47.30 SLEEP APNEA, UNSPECIFIED TYPE: ICD-10-CM

## 2023-12-04 DIAGNOSIS — E78.00 PURE HYPERCHOLESTEROLEMIA: ICD-10-CM

## 2023-12-04 DIAGNOSIS — I49.9 IRREGULAR HEART RATE: ICD-10-CM

## 2023-12-04 PROCEDURE — 99204 OFFICE O/P NEW MOD 45 MIN: CPT | Performed by: INTERNAL MEDICINE

## 2023-12-04 PROCEDURE — 3074F SYST BP LT 130 MM HG: CPT | Performed by: INTERNAL MEDICINE

## 2023-12-04 PROCEDURE — 3079F DIAST BP 80-89 MM HG: CPT | Performed by: INTERNAL MEDICINE

## 2023-12-04 PROCEDURE — 93000 ELECTROCARDIOGRAM COMPLETE: CPT | Performed by: INTERNAL MEDICINE

## 2023-12-04 NOTE — PROGRESS NOTES
Subjective:     Chief Complaint   Patient presents with    Sleep Disruption     Wake up tired         Diagnoses of Essential hypertension, Prediabetes, Pure hypercholesterolemia, Vitamin D deficiency, Sleep apnea, unspecified type, Irregular heart rate, Hypomagnesemia, and Sinus bradycardia were pertinent to this visit.    HISTORY OF THE PRESENT ILLNESS: Patient is a 63 y.o. male. This pleasant patient is here today to establish care and discuss the following problems. His prior PCP was Dr Montague.  Patient was initially under the impression, that this visit was scheduled as annual physical.  Upon chart review, it appears that his last annual physical was on 12/9/2022 and he is not due yet.  He was frustrated, however understanding.     Problem   Irregular Heart Rate    Patient was told in the past that he had slightly regular heart rate.  Reviewed EKG from 2021: PACs.    He is iWatch has been showing 2-7% A-fib (however there is no rhythm strip available on his phone to confirm).     He denies palpitations, chest pain, shortness of breath.    He had 1 episode of chest pain a long time ago while walking his dog with no recurrence.    Of note: He has CPAP machine, which has not been calibrated in over 10 years.  He does report nonrestorative sleep and disrupted sleep.    EKG Interpretation   Ordered and interpreted by Alejandra Chang MD  Rhythm: normal sinus  Rate: normal, 55 bpm  Axis: normal   Ectopy: none   Conduction: normal   ST Segments: no acute change   T Waves: no acute change   Q Waves: none   Clinical Impression: no acute changes and sinus bradycardia         Sinus Bradycardia   Sleep Apnea    Patient was diagnosed with sleep apnea by his prior PCP around 11-12 years ago.  He has been on CPAP since then.  He tells me he has not seen a sleep medicine physician in a very long time.  His CPAP may have been calibrated around 6 years ago, however he is not 100% sure.  Patient uses CPAP every night, however  "he reports sleep disruption, nonrestorative sleep and recently has noticed A-fib recorded by his iWatch.    I have referred him back to sleep medicine, he will call to set up his first appointment.      Essential Hypertension    Chronic condition. He has been taking lisinopril for several years.  Current medication regimen: lisinopril 10mg daily  Patient tolerating and reports compliant with medications. He does not regularly monitor blood pressure at home. Does not need refill of medications today.   Denies headache, dizziness, vision changes, chest pain, dyspnea, edema.        Prediabetes    Chronic condition. A1C has been stable.   Lab Results   Component Value Date/Time    HBA1C 5.8 (H) 06/15/2023 08:26 AM      He tries to eat healthy in general. He does not regularly exercise. He is otherwise active at work and around the house.       Past Medical History:   Diagnosis Date    Bell's palsy 1980\"s    right face-twitches sometimes still    Hyperlipidemia     Hypertension     Sleep apnea     cpap #12 pressure, no o2    Snoring      Past Surgical History:   Procedure Laterality Date    MENISCECTOMY, KNEE, MEDIAL Left 9/28/2021    Procedure: LEFT KNEE ARTHROSCOPY PARTIAL MEDIAL MENISCECTOMY, REPAIRS AS INDICATED;  Surgeon: Claude Burt M.D.;  Location: [facility];  Service: Orthopedics    KNEE ARTHROSCOPY  5/9/2013    Performed by Brandan De M.D. at SURGERY [facility]    MENISCECTOMY, KNEE, MEDIAL  5/9/2013    Performed by Brandan De M.D. at SURGERY [facility] ORS     Family History   Problem Relation Age of Onset    Stroke Mother     Hypertension Mother     Hyperlipidemia Mother     Heart Disease Father     Hypertension Brother      Social History     Tobacco Use    Smoking status: Never    Smokeless tobacco: Never   Vaping Use    Vaping Use: Never used   Substance Use Topics    Alcohol use: Yes     Comment: beer twice weekly, last dose 1 beer 5/8/13    Drug use: No     Current " "Outpatient Medications Ordered in Epic   Medication Sig Dispense Refill    lisinopril (PRINIVIL) 10 MG Tab Take 1 Tablet by mouth every day. 90 Tablet 3    rosuvastatin (CRESTOR) 20 MG Tab Take 1 Tablet by mouth every evening. 90 Tablet 3     No current Epic-ordered facility-administered medications on file.     Health Maintenance: Completed    Review of Systems   All other systems reviewed and are negative.    Objective:     Exam: /84 (BP Location: Left arm, Patient Position: Sitting, BP Cuff Size: Adult)   Pulse 81   Temp 36.8 °C (98.2 °F) (Temporal)   Ht 1.702 m (5' 7\")   Wt 84 kg (185 lb 3 oz)   SpO2 97%  Body mass index is 29 kg/m².    Physical Exam  Constitutional:       Appearance: Normal appearance.   Cardiovascular:      Rate and Rhythm: Normal rate and regular rhythm.      Pulses: Normal pulses.      Heart sounds: Normal heart sounds. No murmur heard.  Pulmonary:      Effort: Pulmonary effort is normal. No respiratory distress.      Breath sounds: Normal breath sounds.   Neurological:      Mental Status: He is alert and oriented to person, place, and time.   Psychiatric:         Mood and Affect: Mood normal.       Labs: Reviewed CMP, A1c, lipid panel from 6/15/2023.     Assessment & Plan:   63 y.o. male with the following -    Problem List Items Addressed This Visit       Essential hypertension (Chronic)     Stable, controlled, continue lisinopril 10 mg daily         Relevant Orders    Comp Metabolic Panel    TSH WITH REFLEX TO FT4    Prediabetes (Chronic)    Sleep apnea (Chronic)    Relevant Orders    Referral to Pulmonary and Sleep Medicine    Irregular heart rate    Relevant Orders    EKG - Clinic Performed (Completed)    [facility] ZIO PATCH MONITOR    Pure hypercholesterolemia    Sinus bradycardia     Other Visit Diagnoses       Vitamin D deficiency        Relevant Orders    VITAMIN D,25 HYDROXY (DEFICIENCY)    Hypomagnesemia        Relevant Orders    MAGNESIUM          Return in about 6 weeks " (around 1/15/2024), or if symptoms worsen or fail to improve, for with annual physical .    Please note that this dictation was created using voice recognition software. I have made every reasonable attempt to correct obvious errors, but I expect that there are errors of grammar and possibly content that I did not discover before finalizing the note.

## 2023-12-07 ENCOUNTER — NON-PROVIDER VISIT (OUTPATIENT)
Dept: CARDIOLOGY | Facility: MEDICAL CENTER | Age: 63
End: 2023-12-07
Attending: INTERNAL MEDICINE
Payer: COMMERCIAL

## 2023-12-07 DIAGNOSIS — I49.3 PVC (PREMATURE VENTRICULAR CONTRACTION): ICD-10-CM

## 2023-12-07 DIAGNOSIS — I49.1 ATRIAL ECTOPY: ICD-10-CM

## 2023-12-07 DIAGNOSIS — I49.9 IRREGULAR HEART RATE: ICD-10-CM

## 2023-12-07 PROCEDURE — 93246 EXT ECG>7D<15D RECORDING: CPT

## 2023-12-07 NOTE — PROGRESS NOTES
Patient enrolled in the 14 day [facility] Holter monitoring program per Alejandra Chang MD.  >Office hook-up, serial # [id number].  >Currently pending EOS.

## 2023-12-20 ENCOUNTER — HOSPITAL ENCOUNTER (OUTPATIENT)
Dept: LAB | Facility: MEDICAL CENTER | Age: 63
End: 2023-12-20
Attending: INTERNAL MEDICINE
Payer: COMMERCIAL

## 2023-12-20 DIAGNOSIS — I10 ESSENTIAL HYPERTENSION: ICD-10-CM

## 2023-12-20 DIAGNOSIS — E83.42 HYPOMAGNESEMIA: ICD-10-CM

## 2023-12-20 DIAGNOSIS — E55.9 VITAMIN D DEFICIENCY: ICD-10-CM

## 2023-12-20 LAB
25(OH)D3 SERPL-MCNC: 32 NG/ML (ref 30–100)
ALBUMIN SERPL BCP-MCNC: 4.6 G/DL (ref 3.2–4.9)
ALBUMIN/GLOB SERPL: 1.8 G/DL
ALP SERPL-CCNC: 58 U/L (ref 30–99)
ALT SERPL-CCNC: 60 U/L (ref 2–50)
ANION GAP SERPL CALC-SCNC: 10 MMOL/L (ref 7–16)
AST SERPL-CCNC: 26 U/L (ref 12–45)
BILIRUB SERPL-MCNC: 0.7 MG/DL (ref 0.1–1.5)
BUN SERPL-MCNC: 17 MG/DL (ref 8–22)
CALCIUM ALBUM COR SERPL-MCNC: 9 MG/DL (ref 8.5–10.5)
CALCIUM SERPL-MCNC: 9.5 MG/DL (ref 8.5–10.5)
CHLORIDE SERPL-SCNC: 105 MMOL/L (ref 96–112)
CO2 SERPL-SCNC: 27 MMOL/L (ref 20–33)
CREAT SERPL-MCNC: 1.05 MG/DL (ref 0.5–1.4)
GFR SERPLBLD CREATININE-BSD FMLA CKD-EPI: 79 ML/MIN/1.73 M 2
GLOBULIN SER CALC-MCNC: 2.5 G/DL (ref 1.9–3.5)
GLUCOSE SERPL-MCNC: 86 MG/DL (ref 65–99)
MAGNESIUM SERPL-MCNC: 2.2 MG/DL (ref 1.5–2.5)
POTASSIUM SERPL-SCNC: 4.3 MMOL/L (ref 3.6–5.5)
PROT SERPL-MCNC: 7.1 G/DL (ref 6–8.2)
SODIUM SERPL-SCNC: 142 MMOL/L (ref 135–145)
TSH SERPL DL<=0.005 MIU/L-ACNC: 2.5 UIU/ML (ref 0.38–5.33)

## 2023-12-20 PROCEDURE — 80053 COMPREHEN METABOLIC PANEL: CPT

## 2023-12-20 PROCEDURE — 36415 COLL VENOUS BLD VENIPUNCTURE: CPT

## 2023-12-20 PROCEDURE — 84443 ASSAY THYROID STIM HORMONE: CPT

## 2023-12-20 PROCEDURE — 83735 ASSAY OF MAGNESIUM: CPT

## 2023-12-20 PROCEDURE — 82306 VITAMIN D 25 HYDROXY: CPT

## 2023-12-26 ENCOUNTER — TELEPHONE (OUTPATIENT)
Dept: CARDIOLOGY | Facility: MEDICAL CENTER | Age: 63
End: 2023-12-26
Payer: COMMERCIAL

## 2023-12-27 PROBLEM — I49.3 PVC'S (PREMATURE VENTRICULAR CONTRACTIONS): Status: ACTIVE | Noted: 2023-12-27

## 2023-12-27 PROCEDURE — 93248 EXT ECG>7D<15D REV&INTERPJ: CPT | Performed by: INTERNAL MEDICINE

## 2024-01-17 ENCOUNTER — OFFICE VISIT (OUTPATIENT)
Dept: MEDICAL GROUP | Facility: MEDICAL CENTER | Age: 64
End: 2024-01-17
Payer: COMMERCIAL

## 2024-01-17 VITALS
SYSTOLIC BLOOD PRESSURE: 122 MMHG | BODY MASS INDEX: 29.2 KG/M2 | TEMPERATURE: 97.9 F | HEIGHT: 67 IN | OXYGEN SATURATION: 96 % | WEIGHT: 186.07 LBS | HEART RATE: 82 BPM | DIASTOLIC BLOOD PRESSURE: 74 MMHG

## 2024-01-17 DIAGNOSIS — Z23 NEED FOR HEPATITIS B VACCINATION: ICD-10-CM

## 2024-01-17 DIAGNOSIS — Z23 NEED FOR VACCINATION: ICD-10-CM

## 2024-01-17 DIAGNOSIS — E78.00 PURE HYPERCHOLESTEROLEMIA: ICD-10-CM

## 2024-01-17 DIAGNOSIS — R74.01 ELEVATED ALT MEASUREMENT: ICD-10-CM

## 2024-01-17 DIAGNOSIS — I49.3 PVC'S (PREMATURE VENTRICULAR CONTRACTIONS): ICD-10-CM

## 2024-01-17 DIAGNOSIS — Z00.00 ANNUAL PHYSICAL EXAM: ICD-10-CM

## 2024-01-17 DIAGNOSIS — I10 ESSENTIAL HYPERTENSION: ICD-10-CM

## 2024-01-17 DIAGNOSIS — Z12.5 PROSTATE CANCER SCREENING: ICD-10-CM

## 2024-01-17 DIAGNOSIS — R73.03 PREDIABETES: Chronic | ICD-10-CM

## 2024-01-17 PROCEDURE — 3074F SYST BP LT 130 MM HG: CPT | Performed by: INTERNAL MEDICINE

## 2024-01-17 PROCEDURE — 99396 PREV VISIT EST AGE 40-64: CPT | Mod: 25 | Performed by: INTERNAL MEDICINE

## 2024-01-17 PROCEDURE — 3078F DIAST BP <80 MM HG: CPT | Performed by: INTERNAL MEDICINE

## 2024-01-17 PROCEDURE — 90746 HEPB VACCINE 3 DOSE ADULT IM: CPT | Performed by: INTERNAL MEDICINE

## 2024-01-17 PROCEDURE — 90471 IMMUNIZATION ADMIN: CPT | Performed by: INTERNAL MEDICINE

## 2024-01-17 RX ORDER — ROSUVASTATIN CALCIUM 20 MG/1
20 TABLET, COATED ORAL EVERY EVENING
Qty: 90 TABLET | Refills: 3 | Status: SHIPPED | OUTPATIENT
Start: 2024-01-17

## 2024-01-17 RX ORDER — METOPROLOL SUCCINATE 25 MG/1
12.5 TABLET, EXTENDED RELEASE ORAL DAILY
Qty: 45 TABLET | Refills: 3 | Status: SHIPPED | OUTPATIENT
Start: 2024-01-17

## 2024-01-17 RX ORDER — LISINOPRIL 10 MG/1
10 TABLET ORAL DAILY
Qty: 90 TABLET | Refills: 3 | Status: SHIPPED | OUTPATIENT
Start: 2024-01-17

## 2024-01-17 ASSESSMENT — PATIENT HEALTH QUESTIONNAIRE - PHQ9: CLINICAL INTERPRETATION OF PHQ2 SCORE: 0

## 2024-01-17 NOTE — PROGRESS NOTES
"Subjective:     CC:  annual physical     HPI:  Camron Mcmahon is a 63 y.o. male who presents for an annual exam. He is feeling well and has no complaints.    Problem   Pvc's (Premature Ventricular Contractions)    Procedure: ZIO for 2 weeks beginning 12/7/2023   DOS: 12/27/2023     Findings:   Underlying rhythm: Sinus   The average heart rate is 84 BPM, and ranges from  BPM     Atrial events: Occasional atrial ectopy with an overall burden of 1.2%.  No A-fib     Ventricular events: Occasional ventricular ectopy with an overall burden of 4.8%.  No VT     Pauses, bradyarrhythmia or heart block: None     Patient events: 1 event submitted for review.  The event corresponds to a PVC     Impressions:   Symptomatic 2-week monitor with symptoms corresponding to PVC   Occasional atrial and ventricular ectopy     He will try to cut back on caffeine intake, start magnesium glycinate supplementation, start low-dose metoprolol 12.5 mg daily.         Pure Hypercholesterolemia    Chronic condition. Has been on statin since 2019.   Current regimen: rosuvastatin 20mg daily  He reports compliance and tolerating medication well. Denies musculoskeletal pain, headache, diarrhea. He does not need medication refill today. No acute concerns at this time.          Health Maintenance  PT/vit D for falls prevention: not currently  Cholesterol Screening: UTD   Diabetes Screening: prediabetes   Aspirin Use: n.a   Diet: , does not use a lot of butter, sauces, lots of chicken  Exercise: works in the yard a lot, walking skiing   Substance Abuse: none   Safe in relationship.   Seat belts, bike helmet, gun safety discussed.  Sun protection used.    Cancer screening\"   Colorectal Cancer Screening: Mar 1, 2021   Lung Cancer Screening:  n/a   Prostate Cancer Screening/PSA: 2020     Infectious disease screening/Immunizations  --STI Screening: deferred   --Practices safe sex.  --HIV Screening: deferred   --Hepatitis C Screening: UTD " "  --Immunizations:    Influenza: yearly    Tetanus: 2016   Shingles: UTD     COVID-19: UTD   Hep B : Not immune, will receive first dose in office today    He  has a past medical history of Bell's palsy (1980\"s), Hyperlipidemia, Hypertension, Sleep apnea, and Snoring.  He  has a past surgical history that includes knee arthroscopy (5/9/2013); meniscectomy, knee, medial (5/9/2013); and meniscectomy, knee, medial (Left, 9/28/2021).  Family History   Problem Relation Age of Onset    Stroke Mother     Hypertension Mother     Hyperlipidemia Mother     Heart Disease Father     Hypertension Brother      Social History     Tobacco Use    Smoking status: Never    Smokeless tobacco: Never   Vaping Use    Vaping Use: Never used   Substance Use Topics    Alcohol use: Yes     Comment: beer twice weekly, last dose 1 beer 5/8/13    Drug use: No       Patient Active Problem List    Diagnosis Date Noted    PVC's (premature ventricular contractions) 12/27/2023    Irregular heart rate 12/04/2023    Sinus bradycardia 12/04/2023    Preventative health care 12/09/2022    Erectile dysfunction 12/09/2022    Personal history of colonic polyps 12/09/2021    Sleep apnea 12/08/2021    Overweight (BMI 25.0-29.9) 12/04/2021    Complex tear of medial meniscus of left knee 07/29/2021    Injury of clavicle, initial encounter 11/05/2020    Pure hypercholesterolemia 09/25/2019    Essential hypertension 09/25/2019    Actinic keratosis 09/25/2019    Prediabetes 09/25/2019    Osteoarthrosis involving lower leg 05/09/2013       Current Outpatient Medications   Medication Sig Dispense Refill    metoprolol SR (TOPROL XL) 25 MG TABLET SR 24 HR Take 0.5 Tablets by mouth every day. 45 Tablet 3    lisinopril (PRINIVIL) 10 MG Tab Take 1 Tablet by mouth every day. 90 Tablet 3    rosuvastatin (CRESTOR) 20 MG Tab Take 1 Tablet by mouth every evening. 90 Tablet 3     No current facility-administered medications for this visit.    (including changes " "today)    Allergies: Patient has no known allergies.    Review of Systems   Constitutional: Negative for fever, chills and malaise/fatigue.   HENT: Negative for congestion.    Eyes: Negative for pain.   Respiratory: Negative for cough and shortness of breath.    Cardiovascular: Negative for leg swelling.   Gastrointestinal: Negative for nausea, vomiting, abdominal pain and diarrhea.   Genitourinary: Negative for dysuria and hematuria.   Skin: Negative for rash.   Neurological: Negative for dizziness, focal weakness and headaches.   Endo/Heme/Allergies: Does not bleed easily.   Psychiatric/Behavioral: Negative for depression.  The patient is not nervous/anxious.      Objective:     /74 (BP Location: Left arm, Patient Position: Sitting, BP Cuff Size: Adult)   Pulse 82   Temp 36.6 °C (97.9 °F) (Temporal)   Ht 1.702 m (5' 7\")   Wt 84.4 kg (186 lb 1.1 oz)   SpO2 96%   BMI 29.14 kg/m²   Body mass index is 29.14 kg/m².  Wt Readings from Last 4 Encounters:   01/17/24 84.4 kg (186 lb 1.1 oz)   12/04/23 84 kg (185 lb 3 oz)   06/14/23 82.7 kg (182 lb 5.1 oz)   12/09/22 83.9 kg (185 lb)     Physical Exam  Constitutional:       General: He is not in acute distress.     Appearance: Normal appearance. He is not toxic-appearing.   HENT:      Head: Normocephalic and atraumatic.      Right Ear: Tympanic membrane, ear canal and external ear normal. There is no impacted cerumen.      Left Ear: Tympanic membrane, ear canal and external ear normal. There is no impacted cerumen.      Nose: No congestion or rhinorrhea.      Mouth/Throat:      Mouth: Mucous membranes are moist.   Eyes:      Conjunctiva/sclera: Conjunctivae normal.      Pupils: Pupils are equal, round, and reactive to light.   Cardiovascular:      Rate and Rhythm: Normal rate and regular rhythm.      Pulses: Normal pulses.      Heart sounds: Normal heart sounds. No murmur heard.  Pulmonary:      Effort: Pulmonary effort is normal. No respiratory distress.      " Breath sounds: Normal breath sounds.   Abdominal:      General: There is no distension.      Palpations: Abdomen is soft.      Tenderness: There is no abdominal tenderness.   Musculoskeletal:         General: Normal range of motion.      Right lower leg: No edema.      Left lower leg: No edema.   Neurological:      Mental Status: He is alert and oriented to person, place, and time. Mental status is at baseline.   Psychiatric:         Mood and Affect: Mood normal.       Assessment and Plan:     Problem List Items Addressed This Visit       Essential hypertension (Chronic)    Relevant Medications    metoprolol SR (TOPROL XL) 25 MG TABLET SR 24 HR    lisinopril (PRINIVIL) 10 MG Tab    rosuvastatin (CRESTOR) 20 MG Tab    Other Relevant Orders    Lipid Profile    Prediabetes (Chronic)    Relevant Orders    HEMOGLOBIN A1C    Pure hypercholesterolemia    Relevant Medications    metoprolol SR (TOPROL XL) 25 MG TABLET SR 24 HR    lisinopril (PRINIVIL) 10 MG Tab    rosuvastatin (CRESTOR) 20 MG Tab    PVC's (premature ventricular contractions)    Relevant Medications    metoprolol SR (TOPROL XL) 25 MG TABLET SR 24 HR    lisinopril (PRINIVIL) 10 MG Tab    rosuvastatin (CRESTOR) 20 MG Tab     Other Visit Diagnoses       Prostate cancer screening        Relevant Orders    PROSTATE SPECIFIC AG SCREENING    Need for vaccination        Relevant Orders    Hep B Adult 20+ (Completed)    Need for hepatitis B vaccination        Relevant Orders    Hep B Adult 20+ (Completed)    Elevated ALT measurement        Relevant Orders    Comp Metabolic Panel    Annual physical exam              HCM: as above   Labs per orders.  Vaccinations per orders.  Counseling about diet, supplements, exercise, skin care and safe sex.    Follow-up: Return in about 6 months (around 7/17/2024) for follow up on lab results.    Please note that this dictation was created using voice recognition software. I have made every reasonable attempt to correct obvious  errors, but I expect that there are errors of grammar and possibly content that I did not discover before finalizing the note.

## 2024-02-16 ENCOUNTER — NON-PROVIDER VISIT (OUTPATIENT)
Dept: MEDICAL GROUP | Facility: MEDICAL CENTER | Age: 64
End: 2024-02-16
Payer: COMMERCIAL

## 2024-02-16 DIAGNOSIS — Z23 IMMUNIZATION DUE: ICD-10-CM

## 2024-02-16 PROCEDURE — 90471 IMMUNIZATION ADMIN: CPT | Performed by: INTERNAL MEDICINE

## 2024-02-16 PROCEDURE — 90746 HEPB VACCINE 3 DOSE ADULT IM: CPT | Performed by: INTERNAL MEDICINE

## 2024-02-16 NOTE — PROGRESS NOTES
"Camron Mcmahon is a 63 y.o. male here for a non-provider visit for:   HEPATITIS B 2 of 3    Reason for immunization: continue or complete series started at the office  Immunization records indicate need for vaccine: Yes, confirmed with Epic  Minimum interval has been met for this vaccine: Yes  ABN completed: Yes    VIS Dated  2023 was given to patient: Yes  All IAC Questionnaire questions were answered \"No.\"    Patient tolerated injection and no adverse effects were observed or reported: Yes    Pt scheduled for next dose in series: Yes  "

## 2024-04-01 ENCOUNTER — OFFICE VISIT (OUTPATIENT)
Dept: URGENT CARE | Facility: CLINIC | Age: 64
End: 2024-04-01
Payer: COMMERCIAL

## 2024-04-01 ENCOUNTER — HOSPITAL ENCOUNTER (OUTPATIENT)
Dept: RADIOLOGY | Facility: MEDICAL CENTER | Age: 64
End: 2024-04-01
Attending: NURSE PRACTITIONER
Payer: COMMERCIAL

## 2024-04-01 ENCOUNTER — HOSPITAL ENCOUNTER (OUTPATIENT)
Dept: LAB | Facility: MEDICAL CENTER | Age: 64
End: 2024-04-01
Attending: NURSE PRACTITIONER
Payer: COMMERCIAL

## 2024-04-01 VITALS
SYSTOLIC BLOOD PRESSURE: 142 MMHG | WEIGHT: 186 LBS | DIASTOLIC BLOOD PRESSURE: 78 MMHG | OXYGEN SATURATION: 95 % | TEMPERATURE: 97 F | HEART RATE: 58 BPM | BODY MASS INDEX: 29.89 KG/M2 | RESPIRATION RATE: 16 BRPM | HEIGHT: 66 IN

## 2024-04-01 DIAGNOSIS — K57.92 ACUTE DIVERTICULITIS OF INTESTINE: ICD-10-CM

## 2024-04-01 DIAGNOSIS — R10.32 LLQ PAIN: ICD-10-CM

## 2024-04-01 LAB
ANION GAP SERPL CALC-SCNC: 9 MMOL/L (ref 7–16)
APPEARANCE UR: CLEAR
BILIRUB UR STRIP-MCNC: NEGATIVE MG/DL
BUN SERPL-MCNC: 20 MG/DL (ref 8–22)
CALCIUM SERPL-MCNC: 9.4 MG/DL (ref 8.5–10.5)
CHLORIDE SERPL-SCNC: 102 MMOL/L (ref 96–112)
CO2 SERPL-SCNC: 27 MMOL/L (ref 20–33)
COLOR UR AUTO: YELLOW
CREAT SERPL-MCNC: 1.13 MG/DL (ref 0.5–1.4)
GFR SERPLBLD CREATININE-BSD FMLA CKD-EPI: 72 ML/MIN/1.73 M 2
GLUCOSE SERPL-MCNC: 106 MG/DL (ref 65–99)
GLUCOSE UR STRIP.AUTO-MCNC: NEGATIVE MG/DL
KETONES UR STRIP.AUTO-MCNC: NEGATIVE MG/DL
LEUKOCYTE ESTERASE UR QL STRIP.AUTO: NEGATIVE
NITRITE UR QL STRIP.AUTO: NEGATIVE
PH UR STRIP.AUTO: 5.5 [PH] (ref 5–8)
POTASSIUM SERPL-SCNC: 4.6 MMOL/L (ref 3.6–5.5)
PROT UR QL STRIP: NEGATIVE MG/DL
RBC UR QL AUTO: NEGATIVE
SODIUM SERPL-SCNC: 138 MMOL/L (ref 135–145)
SP GR UR STRIP.AUTO: 1.02
UROBILINOGEN UR STRIP-MCNC: 0.2 MG/DL

## 2024-04-01 PROCEDURE — 74177 CT ABD & PELVIS W/CONTRAST: CPT

## 2024-04-01 PROCEDURE — 700117 HCHG RX CONTRAST REV CODE 255: Performed by: NURSE PRACTITIONER

## 2024-04-01 PROCEDURE — 3077F SYST BP >= 140 MM HG: CPT | Performed by: NURSE PRACTITIONER

## 2024-04-01 PROCEDURE — 3078F DIAST BP <80 MM HG: CPT | Performed by: NURSE PRACTITIONER

## 2024-04-01 PROCEDURE — 36415 COLL VENOUS BLD VENIPUNCTURE: CPT

## 2024-04-01 PROCEDURE — 99214 OFFICE O/P EST MOD 30 MIN: CPT | Performed by: NURSE PRACTITIONER

## 2024-04-01 PROCEDURE — 81002 URINALYSIS NONAUTO W/O SCOPE: CPT | Performed by: NURSE PRACTITIONER

## 2024-04-01 PROCEDURE — 80048 BASIC METABOLIC PNL TOTAL CA: CPT

## 2024-04-01 RX ORDER — AMOXICILLIN AND CLAVULANATE POTASSIUM 875; 125 MG/1; MG/1
1 TABLET, FILM COATED ORAL 2 TIMES DAILY
Qty: 20 TABLET | Refills: 0 | Status: SHIPPED | OUTPATIENT
Start: 2024-04-01 | End: 2024-04-11

## 2024-04-01 RX ADMIN — IOHEXOL 25 ML: 240 INJECTION, SOLUTION INTRATHECAL; INTRAVASCULAR; INTRAVENOUS; ORAL at 17:17

## 2024-04-01 RX ADMIN — IOHEXOL 100 ML: 350 INJECTION, SOLUTION INTRAVENOUS at 17:17

## 2024-04-01 NOTE — PROGRESS NOTES
"Camron Mcmahon is a 63 y.o. male who presents for LLQ Pain (X 3 days )      HPI  This is a new problem. Camron Mcmahon is a 63 y.o. patient who presents to urgent care with c/o: LLQ x 3 days.  Pain is sharp and does not radiate.  Pain 2-3/10.  Changing positions causes pain. Pressure to the area causes pain. Slept well last night.  BM twice this morning. No blood. Denies nausea , fever, back pain. Tx tried: none.     ROS See HPI    Allergies:     No Known Allergies    PMSFS Hx:  Past Medical History:   Diagnosis Date    Bell's palsy 1980\"s    right face-twitches sometimes still    Hyperlipidemia     Hypertension     Sleep apnea     cpap #12 pressure, no o2    Snoring      Past Surgical History:   Procedure Laterality Date    MENISCECTOMY, KNEE, MEDIAL Left 9/28/2021    Procedure: LEFT KNEE ARTHROSCOPY PARTIAL MEDIAL MENISCECTOMY, REPAIRS AS INDICATED;  Surgeon: Claude Burt M.D.;  Location: [facility];  Service: Orthopedics    KNEE ARTHROSCOPY  5/9/2013    Performed by Brandan De M.D. at SURGERY [facility]    MENISCECTOMY, KNEE, MEDIAL  5/9/2013    Performed by Brandan De M.D. at SURGERY [facility]     Family History   Problem Relation Age of Onset    Stroke Mother     Hypertension Mother     Hyperlipidemia Mother     Heart Disease Father     Hypertension Brother      Social History     Tobacco Use    Smoking status: Never    Smokeless tobacco: Never   Substance Use Topics    Alcohol use: Yes     Comment: beer twice weekly, last dose 1 beer 5/8/13       Problems:   Patient Active Problem List   Diagnosis    Osteoarthrosis involving lower leg    Pure hypercholesterolemia    Essential hypertension    Actinic keratosis    Prediabetes    Injury of clavicle, initial encounter    Complex tear of medial meniscus of left knee    Overweight (BMI 25.0-29.9)    Sleep apnea    Personal history of colonic polyps    Preventative health care    Erectile dysfunction    Irregular heart rate    " "Sinus bradycardia    PVC's (premature ventricular contractions)       Medications:   Current Outpatient Medications on File Prior to Visit   Medication Sig Dispense Refill    metoprolol SR (TOPROL XL) 25 MG TABLET SR 24 HR Take 0.5 Tablets by mouth every day. 45 Tablet 3    lisinopril (PRINIVIL) 10 MG Tab Take 1 Tablet by mouth every day. 90 Tablet 3    rosuvastatin (CRESTOR) 20 MG Tab Take 1 Tablet by mouth every evening. 90 Tablet 3     No current facility-administered medications on file prior to visit.        Objective:     BP (!) 142/78   Pulse (!) 58   Temp 36.1 °C (97 °F)   Resp 16   Ht 1.676 m (5' 6\")   Wt 84.4 kg (186 lb)   SpO2 95%   BMI 30.02 kg/m²     Physical Exam  Vitals and nursing note reviewed.   Constitutional:       General: He is not in acute distress.     Appearance: Normal appearance. He is well-developed. He is not ill-appearing or toxic-appearing.   HENT:      Head: Normocephalic.   Cardiovascular:      Rate and Rhythm: Normal rate and regular rhythm.      Pulses: Normal pulses.      Heart sounds: Normal heart sounds.   Pulmonary:      Effort: Pulmonary effort is normal.      Breath sounds: Normal breath sounds.   Abdominal:      Palpations: Abdomen is soft.      Tenderness: There is abdominal tenderness in the left lower quadrant. There is no right CVA tenderness or left CVA tenderness.   Musculoskeletal:         General: Normal range of motion.      Cervical back: Neck supple.   Skin:     General: Skin is warm and dry.      Capillary Refill: Capillary refill takes less than 2 seconds.   Neurological:      Mental Status: He is alert and oriented to person, place, and time.      Deep Tendon Reflexes: Reflexes are normal and symmetric.   Psychiatric:         Mood and Affect: Mood normal.         Behavior: Behavior normal. Behavior is cooperative.         RADIOLOGY RESULTS   CT-ABDOMEN-PELVIS WITH    Result Date: 4/1/2024 4/1/2024 5:00 PM HISTORY/REASON FOR EXAM:  LLQ pain. " TECHNIQUE/EXAM DESCRIPTION:   CT scan of the abdomen and pelvis with contrast. Contrast-enhanced helical scanning was obtained from the diaphragmatic domes through the pubic symphysis following the bolus administration of nonionic contrast without complication. 100 mL of Omnipaque 350 nonionic contrast was administered without complication. Low dose optimization technique was utilized for this CT exam including automated exposure control and adjustment of the mA and/or kV according to patient size. COMPARISON: No prior studies available. FINDINGS: Lower Chest: Small sliding-type hiatal hernia. Liver: Normal. Spleen: Unremarkable. Pancreas: Unremarkable. Gallbladder: Contracted. Biliary: Nondilated. Adrenal glands: Normal. Kidneys: Unremarkable without hydronephrosis. Bowel: No evidence for obstruction.  Normal appendix.  Colonic diverticula, primarily sigmoid minimal stranding adjacent the distal descending colon. Lymph nodes: No adenopathy. Vasculature: Unremarkable. Peritoneum: No peritoneal fluid or pneumoperitoneum. Musculoskeletal: Lumbar spine degenerative changes. Pelvis: Bladder is unremarkable.  No dependent free fluid.     1.  Early versus mild distal descending colon diverticulitis.   2.  No abscess or evidence of bowel perforation.   3.  Normal appendix.          Results for orders placed or performed in visit on 04/01/24   POCT Urinalysis   Result Value Ref Range    POC Color yellow Negative    POC Appearance clear Negative    POC Glucose negative Negative mg/dL    POC Bilirubin negative Negative mg/dL    POC Ketones negative Negative mg/dL    POC Specific Gravity 1.025 <1.005 - >1.030    POC Blood negative Negative    POC Urine PH 5.5 5.0 - 8.0    POC Protein negative Negative mg/dL    POC Urobiligen 0.2 Negative (0.2) mg/dL    POC Nitrites negative Negative    POC Leukocyte Esterase negative Negative         Assessment /Associated Orders:      1. LLQ pain  POCT Urinalysis    CT-ABDOMEN-PELVIS WITH     Basic Metabolic Panel      2. Acute diverticulitis of intestine  amoxicillin-clavulanate (AUGMENTIN) 875-125 MG Tab              Medical Decision Making:    Camron is a very pleasant 63 y.o. male who is clinically stable at today's acute urgent care visit.  No acute distress noted.  VSS. Appropriate for outpatient care at this time.   Acute problem today with uncertain prognosis.   Results of tests discussed today (including any incidental findings if present).  Educated in proper administration of  prescription medication(s) ordered today including safety, possible SE, risks, benefits, rationale and alternatives to therapy.   Liquid diet until pain resolves then increase slowly to high fiber diet.   OTC  analgesic of choice (acetaminophen or NSAID) prn pain. Follow manufactures dosing and safety precautions.     Discussed Dx, management options (risks,benefits, and alternatives to planned treatment), natural progression and supportive care.  Expressed understanding and the treatment plan was agreed upon.   Questions were encouraged and answered   Return to urgent care prn if new or worsening sx or if there is no improvement in condition prn.    Educated in Red flags and indications to immediately call 911 or present to the Emergency Department.         Please note that this dictation was created using voice recognition software. I have worked with consultants from the vendor as well as technical experts from [facility] to optimize the interface. I have made every reasonable attempt to correct obvious errors, but I expect that there are errors of grammar and possibly content that I did not discover before finalizing the note.  This note was electronically signed by provider

## 2024-05-15 ENCOUNTER — HOSPITAL ENCOUNTER (OUTPATIENT)
Dept: LAB | Facility: MEDICAL CENTER | Age: 64
End: 2024-05-15
Attending: INTERNAL MEDICINE
Payer: COMMERCIAL

## 2024-05-15 DIAGNOSIS — R73.03 PREDIABETES: Chronic | ICD-10-CM

## 2024-05-15 DIAGNOSIS — I10 ESSENTIAL HYPERTENSION: ICD-10-CM

## 2024-05-15 DIAGNOSIS — Z12.5 PROSTATE CANCER SCREENING: ICD-10-CM

## 2024-05-15 DIAGNOSIS — R74.01 ELEVATED ALT MEASUREMENT: ICD-10-CM

## 2024-05-15 LAB
ALBUMIN SERPL BCP-MCNC: 4.4 G/DL (ref 3.2–4.9)
ALBUMIN/GLOB SERPL: 2.1 G/DL
ALP SERPL-CCNC: 54 U/L (ref 30–99)
ALT SERPL-CCNC: 41 U/L (ref 2–50)
ANION GAP SERPL CALC-SCNC: 10 MMOL/L (ref 7–16)
AST SERPL-CCNC: 29 U/L (ref 12–45)
BILIRUB SERPL-MCNC: 0.5 MG/DL (ref 0.1–1.5)
BUN SERPL-MCNC: 18 MG/DL (ref 8–22)
CALCIUM ALBUM COR SERPL-MCNC: 8.8 MG/DL (ref 8.5–10.5)
CALCIUM SERPL-MCNC: 9.1 MG/DL (ref 8.5–10.5)
CHLORIDE SERPL-SCNC: 105 MMOL/L (ref 96–112)
CHOLEST SERPL-MCNC: 156 MG/DL (ref 100–199)
CO2 SERPL-SCNC: 24 MMOL/L (ref 20–33)
CREAT SERPL-MCNC: 1 MG/DL (ref 0.5–1.4)
EST. AVERAGE GLUCOSE BLD GHB EST-MCNC: 120 MG/DL
FASTING STATUS PATIENT QL REPORTED: NORMAL
GFR SERPLBLD CREATININE-BSD FMLA CKD-EPI: 83 ML/MIN/1.73 M 2
GLOBULIN SER CALC-MCNC: 2.1 G/DL (ref 1.9–3.5)
GLUCOSE SERPL-MCNC: 111 MG/DL (ref 65–99)
HBA1C MFR BLD: 5.8 % (ref 4–5.6)
HDLC SERPL-MCNC: 44 MG/DL
LDLC SERPL CALC-MCNC: 93 MG/DL
POTASSIUM SERPL-SCNC: 4.3 MMOL/L (ref 3.6–5.5)
PROT SERPL-MCNC: 6.5 G/DL (ref 6–8.2)
PSA SERPL-MCNC: 2.37 NG/ML (ref 0–4)
SODIUM SERPL-SCNC: 139 MMOL/L (ref 135–145)
TRIGL SERPL-MCNC: 94 MG/DL (ref 0–149)

## 2024-05-18 ASSESSMENT — ENCOUNTER SYMPTOMS: SLEEP DISTURBANCE: 0

## 2024-05-21 ENCOUNTER — OFFICE VISIT (OUTPATIENT)
Dept: SLEEP MEDICINE | Facility: MEDICAL CENTER | Age: 64
End: 2024-05-21
Attending: INTERNAL MEDICINE
Payer: COMMERCIAL

## 2024-05-21 VITALS
SYSTOLIC BLOOD PRESSURE: 118 MMHG | HEART RATE: 78 BPM | OXYGEN SATURATION: 95 % | WEIGHT: 180 LBS | BODY MASS INDEX: 28.93 KG/M2 | RESPIRATION RATE: 18 BRPM | DIASTOLIC BLOOD PRESSURE: 78 MMHG | HEIGHT: 66 IN

## 2024-05-21 DIAGNOSIS — G47.19 EXCESSIVE DAYTIME SLEEPINESS: ICD-10-CM

## 2024-05-21 DIAGNOSIS — I10 ESSENTIAL HYPERTENSION: Chronic | ICD-10-CM

## 2024-05-21 DIAGNOSIS — I49.9 IRREGULAR HEART RATE: ICD-10-CM

## 2024-05-21 DIAGNOSIS — G47.33 OSA ON CPAP: ICD-10-CM

## 2024-05-21 PROCEDURE — 3074F SYST BP LT 130 MM HG: CPT | Performed by: PREVENTIVE MEDICINE

## 2024-05-21 PROCEDURE — 99204 OFFICE O/P NEW MOD 45 MIN: CPT | Performed by: PREVENTIVE MEDICINE

## 2024-05-21 PROCEDURE — 3078F DIAST BP <80 MM HG: CPT | Performed by: PREVENTIVE MEDICINE

## 2024-05-21 NOTE — PROGRESS NOTES
"CHIEF COMPLIANT: \"Establish care.\"  HISTORY OF PRESENT ILLNESS:  Camron Mcmahon is a 64 y.o. male kindly referred by Alejandra Chang M.D. and  is here for a sleep medicine consultation.     Sleep History:  Camron Mcmahon has  been tested for sleep apnea. The patient reports gasping for air, fragmented sleep  and EDS.  These symptoms are happening while the patient is using PAP therapy.       The patient goes to bed at 12 pm and wakes up at 6:30- 7:30 am. It usually takes the patient approximately 5 mins to fall asleep. The patient does  feel refreshed after sleeping and does not nap during the day. The patient has never used tobacco.  Pt does not use cannabis.  This pt does drink 4 alcoholic beverages per week and has 7 caffeinated beverages daily.     The patient denies any symptoms of narcolepsy such as sleep paralysis or cataplexy, or any symptoms that suggest parasomnias such as sleep walking or acting out of dreams.    Camron Mcmahon is a   works from 1:30 PM to 11 PM    Endorses family members who use PAP therapy/snore:  Mother snored    EPWORTH SCORE:  16    /24, this is consistent with EDS    COMPLIANCE DATA:  > 4 hours at  90 %  Machine type: Airsense 10 CPAP  Date range: 2/21-5/20/2024  AHI: 1.3  TIME USED: 7.5 hrs.  PRESSURE SETTINGS: set as a CPAP at 12.4 CWP  LEAK: minimal  DME:  [facility]        Significant comorbidities and modifying factors: see below    PAST MEDICAL HISTORY:  Past Medical History:   Diagnosis Date    Bell's palsy 1980\"s    right face-twitches sometimes still    Hyperlipidemia     Hypertension     Sleep apnea     cpap #12 pressure, no o2    Snoring       PROBLEM LIST:  Patient Active Problem List    Diagnosis Date Noted    PVC's (premature ventricular contractions) 12/27/2023    Irregular heart rate 12/04/2023    Sinus bradycardia 12/04/2023    Preventative health care 12/09/2022    Erectile dysfunction 12/09/2022    Personal history of colonic polyps 12/09/2021    " Sleep apnea 12/08/2021    Overweight (BMI 25.0-29.9) 12/04/2021    Complex tear of medial meniscus of left knee 07/29/2021    Injury of clavicle, initial encounter 11/05/2020    Pure hypercholesterolemia 09/25/2019    Essential hypertension 09/25/2019    Actinic keratosis 09/25/2019    Prediabetes 09/25/2019    Osteoarthrosis involving lower leg 05/09/2013     PAST SOCIAL HISTORY:  Past Surgical History:   Procedure Laterality Date    MENISCECTOMY, KNEE, MEDIAL Left 9/28/2021    Procedure: LEFT KNEE ARTHROSCOPY PARTIAL MEDIAL MENISCECTOMY, REPAIRS AS INDICATED;  Surgeon: Claude Burt M.D.;  Location: [facility];  Service: Orthopedics    KNEE ARTHROSCOPY  5/9/2013    Performed by Brandan De M.D. at SURGERY [facility]    MENISCECTOMY, KNEE, MEDIAL  5/9/2013    Performed by Brandan De M.D. at SURGERY [facility]     PAST FAMILY HISTORY:  Family History   Problem Relation Age of Onset    Stroke Mother     Hypertension Mother     Hyperlipidemia Mother     Heart Disease Father     Hypertension Brother      SOCIAL HISTORY:  Social History     Socioeconomic History    Marital status:      Spouse name: Not on file    Number of children: Not on file    Years of education: Not on file    Highest education level: Not on file   Occupational History    Not on file   Tobacco Use    Smoking status: Never    Smokeless tobacco: Never   Vaping Use    Vaping status: Never Used   Substance and Sexual Activity    Alcohol use: Yes     Comment: beer twice weekly, last dose 1 beer 5/8/13    Drug use: No    Sexual activity: Not on file     Comment:    Other Topics Concern    Not on file   Social History Narrative    Not on file     Social Determinants of Health     Financial Resource Strain: Not on File (8/24/2019)    Received from [facility]     Financial Resource Strain     Financial Resource Strain: 0   Food Insecurity: Not on File (8/24/2019)    Received from [facility]     Food Insecurity      "Food: 0   Transportation Needs: Not on File (2019)    Received from [email or web address]     Transportation Needs     Transportation: 0   Physical Activity: Not on File (2019)    Received from [email or web address]     Physical Activity     Physical Activity: 0   Stress: Not on File (2019)    Received from [email or web address]     Stress     Stress: 0   Social Connections: Not on File (2019)    Received from [email or web address]     Social Connections     Social Connections and Isolation: 0   Intimate Partner Violence: Not on file   Housing Stability: Not on File (2019)    Received from [email or web address]     Housing Stability     Housin     ALLERGIES: Patient has no known allergies.  MEDICATIONS:  Current Outpatient Medications   Medication Sig Dispense Refill    metoprolol SR (TOPROL XL) 25 MG TABLET SR 24 HR Take 0.5 Tablets by mouth every day. 45 Tablet 3    lisinopril (PRINIVIL) 10 MG Tab Take 1 Tablet by mouth every day. 90 Tablet 3    rosuvastatin (CRESTOR) 20 MG Tab Take 1 Tablet by mouth every evening. 90 Tablet 3     No current facility-administered medications for this visit.    \"CURRENT RX\"    REVIEW OF SYSTEMS:  Constitutional: Denies weight loss, endorses chronic daytime fatigue --also see HPI    PHYSICAL EXAM/VITALS:  /78 (BP Location: Left arm, Patient Position: Sitting, BP Cuff Size: Adult)   Pulse 78   Resp 18   Ht 1.676 m (5' 6\")   Wt 81.6 kg (180 lb)   SpO2 95%   BMI 29.05 kg/m²   Neck circumference (inches): 17  Appearance: Well-nourished, well-developed,  looks stated age, no acute distress  Eyes:   EOMI  ENMT:   Hard palate narrow: No   Hard palate high: No   Soft palate/uvula (Mallampati score): 4  Tongue Scalloping: No   Retrognathia:  No   Micrognathia:  No    Neck: Supple, trachea midline  Respiratory effort:  No intercostal retractions or use of accessory muscles  Lung auscultation:  No wheezes rhonchi rubs or rales  Cardiac: No murmurs, rubs, or gallops; regular rhythm, normal rate; no edema  Musculoskeletal:  Grossly " normal; gait and station normal  Neurologic:  oriented to person, time, place, and purpose; judgement intact  Psychiatric:  No depression, anxiety, agitation    MEDICAL DECISION MAKING:  The medical record was reviewed as it pertains to this referral. This includes records from primary care,consultants notes, referral request, hospital records, labs and imaging. Any available diagnostic and titration nocturnal polysomnograms, home sleep apnea tests, continuous nocturnal oximetry results, multiple sleep latency tests, and recent compliance reports were reviewed with the patient.    ASSESSMENT/PLAN:  Camron Mcmahon is a 64 y.o. male  who has been diagnosed with  obstructive sleep apnea.    He has failed CPAP and will need to undergo a split night study to obtain diagnostic data and to titrate the patient on BIPAP.      DIAGNOSES :    1. Excessive daytime sleepiness  - Polysomnography, 4 or More; Future    2. ZACHARY on CPAP  - Polysomnography, 4 or More; Future    3. Essential hypertension  - Polysomnography, 4 or More; Future    4. Irregular heart rate  - Polysomnography, 4 or More; Future    The patient has signs and symptoms consistent with obstructive sleep apnea hypopnea syndrome. Will schedule a nocturnal polysomnogram or a home sleep apnea test (please see plan).  The risks of untreated sleep apnea were discussed with the patient at length. Patients with ZACHARY are at increased risk of cardiovascular disease including coronary artery disease, systemic arterial hypertension, pulmonary arterial hypertension, cardiac arrhythmias, and stroke. ZACHARY patients have an increased risk of motor vehicle accidents, type 2 diabetes, chronic kidney disease, and non-alcoholic liver disease. The patient was advised to avoid driving a motor vehicle when drowsy.  Have advised the patient to follow up with the appropriate healthcare practitioners for all other medical problems and issues.    RETURN TO CLINIC: Return for 3 weeks after SS -  discuss results w/ any provider.    My total time spent caring for the patient on the day of the encounter was 40 minutes. This includes time spent on a thorough chart review including other physician notes, all sleep studies, as well as critical labs and pulmonary and cardiac studies.  Additionally, it includes a thorough discussion of good sleep hygiene and stimulus control, as well as  the need for consistency in terms of sleep preparation and practice.    Please note that this dictation was created using voice recognition software.  I have made every reasonable attempt to correct obvious errors, I expect that there are errors of grammar and possibly content that I did not discover before finalizing this note.                        Answers submitted by the patient for this visit:  Sleep Center Questionnaire (Submitted on 5/18/2024)  Year of your last physical exam: 2024  Occupation : [employer]  Height: 5’6”  Current weight: 178  6 months ago: 180  What is the reason for your visit today?: Need tp replace cpap machine. Haven’t ever met a pulmonologist since being diagnosed with apnea over ten years ago. Feel like my quality of sleep has diminished.  Name of person referring you to the Sleep Center: Dr RAHMAN  Have you ever been hospitalized?: No  Have you ever had problems with anesthesia?: No  Have you experienced post-operative delirium?: No  Any complications with surgery?: No  What year did you receive your last Flu shot?: 2023  What year did you receive you last Pneumonia shot?: 2023  Please briefly describe your sleep problem and how old you were when it began.: Was diagnosed with apnea over ten years ago. Not sure when it began but had energy issues for years prior.  How does this affect your daily life and activities? Please also rate how serious of a problem this is (1 = Not at all, 10 = Very Serious).: I don’t let it affect me I push through.  Have you had any previous evaluations, examinations, or treatment for  this sleep problem or any other problems with your sleep? If so, please describe the evaluation, treatment, and results.: One evaluation over ten years ago put on cpap  Have you used any medications (prescribed or otherwise) to help your sleep problem? If yes, include name, amount, frequency, and the prescribing physician.: Never tried meditation  If employed, what time do you usually start and end work?: Start time 130 pm till 11 pm  Do you ever change work shifts? If yes, describe how often (never, infrequently, regularly).: Infrequently  What time do you usually go to bed and wake up on: Weekdays? Weekends?: 12 pm to 1 am get up between 630 And 730 am  Do you have a regular bed partner?: Yes  How many minutes does it usually take to fall asleep at night after turning off the lights?: Varied  What do you ordinarily do just prior to turning out the lights and attempting to go to sleep (e.g., reading, TV, baths, etc.)?: Shower and a little tv mostly  On average, how many times do you wake up during the night?: Six to eight times  On average, how many times do you wake up to use the bathroom?: Go when i get up  Do you often wake up too early in the morning and are unable to return to sleep?: Yes  On average, how many hours of sleep do you get per night?: Six and a half to seven  How do you usually awaken?  Alarm, spontaneously, or other?: Wake just prior to the alarm  Is it difficult for you to awaken and get out of bed after sleeping? (Not at all, Sometimes, Very): No  Do you nap or return to bed after arising?: No  Are you bothered by sleepiness during the day?: Yes  Do you feel that you get too much sleep at night?: No  Do you feel that you get too little sleep at night?: Not enough quality sleep  Do you usually feel tired during the day? If so, what do you attribute this to?: Sleep apnea  Do you find yourself falling asleep when you don't mean to? : Yes  If yes, how long does your sleep episode last?: Seconds  Do  "you feel rested or refreshed after the sleep episode?: No  Have you ever suddenly fallen?: No  Have you ever experienced sudden body weakness?: No  Have you ever experienced weakness or paralysis upon going to sleep?: No  Have you ever experienced weakness or paralysis upon awakening from sleep?: No  Have you ever experienced seeing things or hearing voices/noises: That weren't real? On going to sleep? During the night? On awakening from sleep? During the day?: No  Do you have difficulty breathing at night? If yes, briefly describe.: No  Have you been told you snore while asleep? If so, does it disturb a bed partner (or someone in the same room), or someone in the next room?: Ues  Have you ever experienced doing something without being aware of the action? If yes, please describe.: No  Have you ever experienced upon lying in bed before sleep or on awakening from sleep: Restlessness of legs, \"nervous legs\", \"creeping crawling\" sensation of legs, or twitching of legs?: Yes  How many times per week does this occur, and how many minutes does the sensation last?: 4-5  Does anything relieve the sensations (e.g., getting out of bed, medication, massage)?: No  Have you ever been told that your arms or legs jerk or twitch while you are asleep? If yes, how many times per night does this occur?: Yes occasionally  At what age did this first occur, and how many years has this occurred?: Thirty plus  Does this seem to awaken you from your sleep?: No    "

## 2024-06-16 ENCOUNTER — SLEEP STUDY (OUTPATIENT)
Dept: SLEEP MEDICINE | Facility: MEDICAL CENTER | Age: 64
End: 2024-06-16
Attending: PREVENTIVE MEDICINE
Payer: COMMERCIAL

## 2024-06-16 DIAGNOSIS — G47.19 EXCESSIVE DAYTIME SLEEPINESS: ICD-10-CM

## 2024-06-16 DIAGNOSIS — I10 ESSENTIAL HYPERTENSION: Chronic | ICD-10-CM

## 2024-06-16 DIAGNOSIS — G47.33 OSA ON CPAP: ICD-10-CM

## 2024-06-16 DIAGNOSIS — I49.9 IRREGULAR HEART RATE: ICD-10-CM

## 2024-06-16 PROCEDURE — 95810 POLYSOM 6/> YRS 4/> PARAM: CPT | Performed by: PREVENTIVE MEDICINE

## 2024-06-17 NOTE — PROCEDURES
Patient: SIMONA DELANEY  ID: [id number] Date: 6/16/2024 Exam No.:   MONTAGE: Standard  STUDY TYPE: Diagnostic  RECORDING TECHNIQUE:   After the scalp was prepared, gold plated electrodes were applied to the scalp according to the International 10-20 System. EEG (electroencephalogram) was continuously monitored from the O1-M2, O2-M1, C3-M2, C4-M1, F3-M2, and F4-M1. EOGs (electrooculograms) were monitored by electrodes placed at the left and right outer canthi. Chin EMG (electromyogram) was monitored by electrodes placed on the mentalis and sub-mentalis muscles. Nasal and oral airflow were monitored using a triple port thermocouple as well as oronasal pressure transducer. Respiratory effort was measured by inductive plethysmography technology employing abdominal and thoracic belts. Blood oxygen saturation and pulse were monitored by pulse oximetry. Heart rhythm was monitored by surface electrocardiogram. Leg EMG was studied using surface electrodes placed on left and right anterior tibialis. A microphone was used to monitor tracheal sounds and snoring. Body position was monitored and documented by technician observation.   SCORING CRITERIA:   A modification of the AASM manual for scoring of sleep and associated events was used. Obstructive apneas were scored by cessation of airflow for at least 10 seconds with continuing respiratory effort. Central apneas were scored by cessation of airflow for at least 10 seconds with no respiratory effort. Hypopneas were scored by a 30% or more reduction in airflow for at least 10 seconds accompanied by arterial oxygen desaturation of 3% or an arousal. For CMS (Medicare) patients, per AASM rule 1B, hypopneas are scored by 30% with mild reduction in airflow for at least 10 seconds accompanied by arterial saturation decreased at 4%.  Study start time was 10:08:43 PM. Diagnostic recording time was 6h 57.5m with a total sleep time of 2h 21.0m resulting in a sleep efficiency of 33.77%%.  Sleep latency from the start of the study was 07 minutes and the latency from sleep to REM was 00 minutes. In total,165 arousals were scored for an arousal index of 70.2.  Respiratory:  There were a total of 82 apneas consisting of 69 obstructive apneas, 0 mixed apneas, and 13 central apneas. A total of 154 hypopneas were scored. The apnea index was 34.89 per hour and the hypopnea index was 65.53 per hour resulting in an overall AHI of 100.43. AHI during REM was 0.0 and AHI while supine was 108.84.  Oximetry:  There was a mean oxygen saturation of 90.0%. The minimum oxygen saturation in NREM was 59.0 % and in REM was --%. The patient spent 38.9 minutes of TST with SaO2 <88%.  Cardiac:  The highest heart rate seen while awake was 108 BPM while the highest heart rate during sleep was 72 BPM with an average sleeping heart rate of 53 BPM.  Limb Movements:  There were a total of 126 PLMs during sleep which resulted in a PLMS index of 53.6. Of these, 45 were associated with arousals which resulted in a PLMS arousal index of 19.1.  Assessment:   ***Obstructive Sleep Apnea Hypopnea - AHI*** ***Nocturnal desaturation - flaquita saturation ***% - saturations <88% below for *** minutes of TST.  Recommendation:

## 2024-07-16 ENCOUNTER — TELEPHONE (OUTPATIENT)
Dept: SLEEP MEDICINE | Facility: MEDICAL CENTER | Age: 64
End: 2024-07-16
Payer: COMMERCIAL

## 2024-07-17 ENCOUNTER — OFFICE VISIT (OUTPATIENT)
Dept: MEDICAL GROUP | Facility: MEDICAL CENTER | Age: 64
End: 2024-07-17
Payer: COMMERCIAL

## 2024-07-17 VITALS
BODY MASS INDEX: 28.9 KG/M2 | SYSTOLIC BLOOD PRESSURE: 122 MMHG | TEMPERATURE: 98.1 F | DIASTOLIC BLOOD PRESSURE: 70 MMHG | HEIGHT: 66 IN | OXYGEN SATURATION: 95 % | WEIGHT: 179.8 LBS

## 2024-07-17 DIAGNOSIS — I10 ESSENTIAL HYPERTENSION: Chronic | ICD-10-CM

## 2024-07-17 DIAGNOSIS — G47.30 SLEEP APNEA, UNSPECIFIED TYPE: Chronic | ICD-10-CM

## 2024-07-17 DIAGNOSIS — R73.03 PREDIABETES: Chronic | ICD-10-CM

## 2024-07-17 DIAGNOSIS — E55.9 VITAMIN D DEFICIENCY: ICD-10-CM

## 2024-07-17 PROCEDURE — 99214 OFFICE O/P EST MOD 30 MIN: CPT | Performed by: INTERNAL MEDICINE

## 2024-07-17 PROCEDURE — 3078F DIAST BP <80 MM HG: CPT | Performed by: INTERNAL MEDICINE

## 2024-07-17 PROCEDURE — 3074F SYST BP LT 130 MM HG: CPT | Performed by: INTERNAL MEDICINE

## 2024-07-17 ASSESSMENT — ENCOUNTER SYMPTOMS: SHORTNESS OF BREATH: 0

## 2024-08-02 ENCOUNTER — APPOINTMENT (OUTPATIENT)
Dept: SLEEP MEDICINE | Facility: MEDICAL CENTER | Age: 64
End: 2024-08-02
Attending: PHYSICIAN ASSISTANT
Payer: COMMERCIAL

## 2024-08-02 VITALS
SYSTOLIC BLOOD PRESSURE: 122 MMHG | BODY MASS INDEX: 28.93 KG/M2 | HEIGHT: 66 IN | OXYGEN SATURATION: 95 % | RESPIRATION RATE: 16 BRPM | HEART RATE: 85 BPM | DIASTOLIC BLOOD PRESSURE: 74 MMHG | WEIGHT: 180 LBS

## 2024-08-02 DIAGNOSIS — Z92.89 HISTORY OF SLEEP STUDY: ICD-10-CM

## 2024-08-02 DIAGNOSIS — G47.33 OSA ON CPAP: ICD-10-CM

## 2024-08-02 PROCEDURE — 99212 OFFICE O/P EST SF 10 MIN: CPT | Performed by: PHYSICIAN ASSISTANT

## 2024-08-02 PROCEDURE — 99213 OFFICE O/P EST LOW 20 MIN: CPT | Performed by: PHYSICIAN ASSISTANT

## 2024-08-02 PROCEDURE — 3074F SYST BP LT 130 MM HG: CPT | Performed by: PHYSICIAN ASSISTANT

## 2024-08-02 PROCEDURE — 3078F DIAST BP <80 MM HG: CPT | Performed by: PHYSICIAN ASSISTANT

## 2024-08-02 RX ORDER — ZOLPIDEM TARTRATE 5 MG/1
5 TABLET ORAL NIGHTLY PRN
Qty: 1 TABLET | Refills: 0 | Status: SHIPPED | OUTPATIENT
Start: 2024-08-02 | End: 2024-08-07

## 2024-08-02 ASSESSMENT — ENCOUNTER SYMPTOMS
CHILLS: 0
DIZZINESS: 0
ORTHOPNEA: 0
FEVER: 0
SINUS PAIN: 0
PALPITATIONS: 0
TREMORS: 0
HEADACHES: 0
SPUTUM PRODUCTION: 0
COUGH: 0
WEIGHT LOSS: 0
INSOMNIA: 1
SORE THROAT: 0
WHEEZING: 0
ROS GI COMMENTS: NO DENTURES, NO MISSING TEETH OR SWALLOWING ISSUES
SHORTNESS OF BREATH: 0
HEARTBURN: 0

## 2024-08-02 NOTE — PROGRESS NOTES
"Chief Complaint   Patient presents with    Apnea     Last Office Visit 05/21/2024 with Ladarius Veloz    Sleep Study Complete on 6/16/2024     Polysomnography, 4 or more - 6/16/24         HPI:  Camron Mcmahon is a 64 y.o. year old male here today for follow-up on sleep study results.  Patient last seen in clinic 5/21/2024 by Dr. Cindy Veloz.    Past Medical History: Essential hypertension, ZACHARY on CPAP, PVCs, irregular heart rate, Bell's palsy, sinus bradycardia, excessive daytime sleepiness.  Patient works as a  afternoon/evenings.     Vitals:  /74 (BP Location: Left arm, Patient Position: Sitting, BP Cuff Size: Adult)   Pulse 85   Resp 16   Ht 1.676 m (5' 6\")   Wt 81.6 kg (180 lb)   SpO2 95%     Recent chest imaging: None    Currently using  Resmed CPAP @12.4 cm H20 pressure; compliance reviewed at last visit.  He is not wireless.    Device obtained prior to 2018  DME provider [facility]   Mask interface fullface mask    Polysomnogram study obtained 6/16/2024 demonstrated findings consistent with severe obstructive sleep apnea with overall AHI of 100.43, in supine position AHI increased to 108.84.  Severely low oxygen saturation was also noted at 59% with sats less than or equal to 88 for 38.9 minutes of total sleep time.  Also noted PLMS index of 53.6.  Given severity of sleep apnea and nocturnal oxygen desaturation patient will need an overnight titration study.  Patient may need supplemental O2.     Sleep schedule goes to bed 1030-11:30 PM and wakens around 6:30 AM, does not have to get up to use the restroom however does wake in during the night.   Symptoms denies day time somnolence and denies morning headache reports delay in sleep onset unless drinking before bed.    Madison Sleepiness Scale reported as 16/24 on 5/21/2024        Review of Systems   Constitutional:  Positive for malaise/fatigue. Negative for chills, fever and weight loss.   HENT:  Positive for congestion (due to smoke). " "Negative for hearing loss, nosebleeds, sinus pain, sore throat and tinnitus.    Eyes:         Presc glasses    Respiratory:  Negative for cough, sputum production, shortness of breath and wheezing.    Cardiovascular:  Negative for chest pain, palpitations, orthopnea and leg swelling.   Gastrointestinal:  Negative for heartburn.        No dentures, no missing teeth or swallowing issues    Neurological:  Negative for dizziness, tremors and headaches.   Psychiatric/Behavioral:  The patient has insomnia (doesn't sleep enough).        Past Medical History:   Diagnosis Date    Bell's palsy 1980\"s    right face-twitches sometimes still    Hyperlipidemia     Hypertension     Sleep apnea     cpap #12 pressure, no o2    Snoring        Past Surgical History:   Procedure Laterality Date    MENISCECTOMY, KNEE, MEDIAL Left 9/28/2021    Procedure: LEFT KNEE ARTHROSCOPY PARTIAL MEDIAL MENISCECTOMY, REPAIRS AS INDICATED;  Surgeon: Claude Burt M.D.;  Location: [facility];  Service: Orthopedics    KNEE ARTHROSCOPY  5/9/2013    Performed by Brandan De M.D. at SURGERY [facility]    MENISCECTOMY, KNEE, MEDIAL  5/9/2013    Performed by Brandan De M.D. at SURGERY [facility]       Family History   Problem Relation Age of Onset    Stroke Mother     Hypertension Mother     Hyperlipidemia Mother     Heart Disease Father     Hypertension Brother        Social History     Socioeconomic History    Marital status:      Spouse name: Not on file    Number of children: Not on file    Years of education: Not on file    Highest education level: Not on file   Occupational History    Not on file   Tobacco Use    Smoking status: Never    Smokeless tobacco: Never   Vaping Use    Vaping status: Never Used   Substance and Sexual Activity    Alcohol use: Yes     Comment: beer twice weekly, last dose 1 beer 5/8/13    Drug use: No    Sexual activity: Not on file     Comment:    Other Topics Concern    Not on " file   Social History Narrative    Not on file     Social Determinants of Health     Financial Resource Strain: Not on File (2019)    Received from [facility]    Financial Resource Strain     Financial Resource Strain: 0   Food Insecurity: Not on File (2019)    Received from [facility]    Food Insecurity     Food: 0   Transportation Needs: Not on File (2019)    Received from [facility]    Transportation Needs     Transportation: 0   Physical Activity: Not on File (2019)    Received from [facility]    Physical Activity     Physical Activity: 0   Stress: Not on File (2019)    Received from [facility]    Stress     Stress: 0   Social Connections: Not on File (2019)    Received from [facility]    Social Connections     Social Connections and Isolation: 0   Intimate Partner Violence: Not on file   Housing Stability: Not on File (2019)    Received from [facility]    Housing Stability     Housin       Allergies as of 2024    (No Known Allergies)          Current medications as of today   Current Outpatient Medications   Medication Sig Dispense Refill    metoprolol SR (TOPROL XL) 25 MG TABLET SR 24 HR Take 0.5 Tablets by mouth every day. 45 Tablet 3    lisinopril (PRINIVIL) 10 MG Tab Take 1 Tablet by mouth every day. 90 Tablet 3    rosuvastatin (CRESTOR) 20 MG Tab Take 1 Tablet by mouth every evening. 90 Tablet 3     No current facility-administered medications for this visit.         Physical Exam:   Gen:           Alert and oriented, No apparent distress. Mood and affect appropriate, normal interaction with examiner.   Hearing:     Grossly intact.  Nose:          Normal, no lesions or deformities.  Dentition:    Good dentition.   Oropharynx:   Tongue normal, posterior pharynx without erythema or exudate.  Mallampati Classification: IV  Neck:        Supple, trachea midline, no masses.  Respiratory Effort: No intercostal retractions or use of accessory muscles.   Gait and Station: Normal.  Digits and Nails: No  clubbing, cyanosis, petechiae, or nodes.   Skin:        No rashes, lesions or ulcers noted.               Ext:           No cyanosis or edema.      Immunizations:  Flu: 9/26/2023  Pfizer booster SARS-CoV-2 vaccine: 12/6/2022   Pfizer SARS CoV2 Vaccine: 11/17/2021, 4/15/2021, 3/24/2021  COVID-19 mRNA Moderna vaccine: 9/26/2023    Assessment / Plan:  1. ZACHARY on CPAP  - Polysomnography Titration  - zolpidem (AMBIEN) 5 MG Tab; Take 1 Tablet by mouth at bedtime as needed for Sleep for up to 1 dose. May try 1/2 tablet and take remaining 1/2 tablet only if needed.  Dispense: 1 Tablet; Refill: 0    2. History of sleep study  - zolpidem (AMBIEN) 5 MG Tab; Take 1 Tablet by mouth at bedtime as needed for Sleep for up to 1 dose. May try 1/2 tablet and take remaining 1/2 tablet only if needed.  Dispense: 1 Tablet; Refill: 0    Reviewed sleep study results consistent with very severe obstructive sleep apnea and nocturnal oxygen desaturation.  He will need titration study for CPAP and BiPAP with recommendation to start CPAP at 10 as patient is currently on  CPAP of 12.4 cm H2O pressure and declines benefit.  Will need short-term follow-up appointment to review results.  Reported delayed sleep onset at prior sleep study will provide single sleeping tablet to help facilitate sleep onset and effective titration.  Patient states understanding.    Follow-up:   Return in about 6 weeks (around 9/13/2024) for Return with Rosario Jules PA-C.    Please note that this dictation was created using voice recognition software. I have made every reasonable attempt to correct obvious errors, but it is possible there are errors of grammar and possibly content that I did not discover before finalizing the note.

## 2024-08-02 NOTE — PATIENT INSTRUCTIONS
1-reviewed sleep study results  2-will need titration study for cpap and bipap  3-currently on cpap therapy with increased fatigue   4-follow up in 6 weeks

## 2024-08-25 ENCOUNTER — SLEEP STUDY (OUTPATIENT)
Dept: SLEEP MEDICINE | Facility: MEDICAL CENTER | Age: 64
End: 2024-08-25
Attending: PHYSICIAN ASSISTANT
Payer: COMMERCIAL

## 2024-08-25 DIAGNOSIS — G47.33 OSA ON CPAP: ICD-10-CM

## 2024-08-25 PROCEDURE — 95811 POLYSOM 6/>YRS CPAP 4/> PARM: CPT | Performed by: STUDENT IN AN ORGANIZED HEALTH CARE EDUCATION/TRAINING PROGRAM

## 2024-08-26 NOTE — PROCEDURES
Patient: SIMONA DELANEY  ID: [id number] Date: 8/25/2024 Exam No.:   MONTAGE: Standard  STUDY TYPE: Treatment  RECORDING TECHNIQUE:   After the scalp was prepared, gold plated electrodes were applied to the scalp according to the International 10-20 System. EEG (electroencephalogram) was continuously monitored from the O1-M2, O2-M1, C3-M2, C4-M1, F3-M2, and F4-M1. EOGs (electrooculograms) were monitored by electrodes placed at the left and right outer canthi. Chin EMG (electromyogram) was monitored by electrodes placed on the mentalis and sub-mentalis muscles. Nasal and oral airflow were monitored using a triple port thermocouple as well as oronasal pressure transducer. Respiratory effort was measured by inductive plethysmography technology employing abdominal and thoracic belts. Blood oxygen saturation and pulse were monitored by pulse oximetry. Heart rhythm was monitored by surface electrocardiogram. Leg EMG was studied using surface electrodes placed on left and right anterior tibialis. A microphone was used to monitor tracheal sounds and snoring. Body position was monitored and documented by technician observation.   SCORING CRITERIA:   A modification of the AASM manual for scoring of sleep and associated events was used. Obstructive apneas were scored by cessation of airflow for at least 10 seconds with continuing respiratory effort. Central apneas were scored by cessation of airflow for at least 10 seconds with no respiratory effort. Hypopneas were scored by a 30% or more reduction in airflow for at least 10 seconds accompanied by arterial oxygen desaturation of 3% or an arousal. For CMS (Medicare) patients, per AASM rule 1B, hypopneas are scored by 30% with mild reduction in airflow for at least 10 seconds accompanied by arterial saturation decreased at 4%.  Study start time was 10:16:39 PM. Diagnostic recording time was 7h 40.0m with a total sleep time of 6h 59.0m resulting in a sleep efficiency of 91.09%%.  Sleep latency from the start of the study was 02 minutes and the latency from sleep to REM was 56 minutes. In total,117 arousals were scored for an arousal index of 16.8.  Respiratory:  There were a total of 8 apneas consisting of 0 obstructive apneas, 0 mixed apneas, and 8 central apneas. A total of 21 hypopneas were scored. The apnea index was 1.15 per hour and the hypopnea index was 3.01 per hour resulting in an overall AHI of 4.15. AHI during REM was 6.3 and AHI while supine was 4.63.  Oximetry:  There was a mean oxygen saturation of 95.0%. The minimum oxygen saturation in NREM was 91.0 % and in REM was 92.0%. The patient spent 0.0 minutes of TST with SaO2 <88%.  Cardiac:  The highest heart rate seen while awake was 90 BPM while the highest heart rate during sleep was 73 BPM with an average sleeping heart rate of 54 BPM.  Limb Movements:  There were a total of 479 PLMs during sleep which resulted in a PLMS index of 68.6. Of these, 76 were associated with arousals which resulted in a PLMS arousal index of 10.9.  Titration:   CPAP was tried from 10 to 13. BiPAP was tried from 17/13 to 18/14  This was a fully attended sleep study. This test was technically adequate. This patient was titrated on CPAP starting at *** cm of water pressure. Patient was titrated up to *** cm of water pressure. Patient did best at *** cm of water pressure. Patient spent *** minutes at that pressure and the AHI was *** which is considered *** obstructive sleep apnea.   Assessment:   ***Obstructive Sleep Apnea Hypopnea - AHI*** ***Nocturnal desaturation - flaquita saturation ***% - saturations <88% below for *** minutes of TST.  Recommendation:    hypopnea index and compliance for further outpatient monitoring and management of PAP therapy. In some cases alternative treatment options may be proven effective in resolving sleep apnea. These options include upper airway surgery, the use of a dental orthotic, weight loss, or positional therapy. Clinical correlation is required. In general patients with sleep apnea are advised to avoid alcohol, sedatives and not to operate a motor vehicle while drowsy.  Untreated sleep apnea increases the risk for cardiovascular and neurovascular disease.

## 2024-09-18 NOTE — PROGRESS NOTES
Chief Complaint   Patient presents with   • Establish Care     SKIN CONCERNS      Camron Mcmahon is a 59 y.o. male here to establish care, previously seen by primary care provider in [place].  He is a  at the [employer].  , blended family with children ranging in age from 9 years old to 27.  We discussed:    Pure hypercholesterolemia  Review of labs shows LDL over 200 at lipid screening for health fair in October 2018.  He had previously been treated with atorvastatin 40 mg daily, ran out of refills and has been off medication more than a year and a half now.  He is agreeable to restarting, denies having had any side effects related to medication use.  He did have very slight ALT elevation in prior labs, reports that this was monitored periodically  No difficulty with chest pain, palpitation, activity intolerance    Essential hypertension  Previously managed with lisinopril 10 mg daily which had worked well for him, out of medication for more than a year.  Initial blood pressure reading taken by the MA in the office today is 132/82, repeat taken by me 138/82.  No chest pain, dizziness, palpitation.  No chronic cough with medication    Actinic keratosis  Rough scaly lesion on the left forearm which is been present for quite some time.  No personal history of skin cancers, no bleeding or pain    Elevated hemoglobin A1c  Review of labs shows very minimally elevated A1c at 5.8.  No polyuria, polydipsia, numbness or tingling in extremities.  He is active at work and around the house but not doing any specific cardio exercise    Current medicines (including changes today)  Current Outpatient Medications   Medication Sig Dispense Refill   • atorvastatin (LIPITOR) 40 MG Tab Take 1 Tab by mouth every bedtime. 90 Tab 3   • lisinopril (PRINIVIL) 10 MG Tab Take 1 Tab by mouth every day. 90 Tab 3   • B Complex Vitamins (VITAMIN B COMPLEX PO) Take 1 Tab by mouth every day. Unknown dose   Indications: Nutritional  Pre chart completed will discuss immunizations at visit.   "Support     • ascorbic acid (ASCORBIC ACID) 500 MG TABS Take 500 mg by mouth every day.       No current facility-administered medications for this visit.      He  has a past medical history of Bell's palsy (\"s), Sleep apnea, and Snoring.  He  has a past surgical history that includes knee arthroscopy (2013) and medial meniscectomy (2013).  Social History     Tobacco Use   • Smoking status: Never Smoker   • Smokeless tobacco: Never Used   Substance Use Topics   • Alcohol use: Yes     Comment: beer twice weekly, last dose 1 beer 13   • Drug use: No     Social History     Social History Narrative   • Not on file     Family History   Problem Relation Age of Onset   • Stroke Mother    • Hypertension Mother    • Hyperlipidemia Mother    • Heart Disease Father    • Hypertension Brother      Family Status   Relation Name Status   • Mo     • Fa     • Bro  Alive   • Bro  Alive   • Bro  Alive         ROS  Problems listed discussed above, all other systems reviewed and negative     Objective:     /82 (BP Location: Left arm, Patient Position: Sitting, BP Cuff Size: Adult)   Pulse 80   Temp 36.4 °C (97.6 °F) (Temporal)   Resp 16   Ht 1.676 m (5' 6\")   Wt 77.1 kg (170 lb)   SpO2 94%  Body mass index is 27.44 kg/m².  Physical Exam:  General: Alert, oriented in no acute distress.  Eye contact is good, speech is normal, affect calm  HEENT: Oral mucosa pink moist, no lesions. Nares patent. TMs gray with good landmarks bilaterally. No cervical or supraclavicular lymphadenopathy, thyroid isthmus palpable without masses or nodules.  Lungs: clear to auscultation bilaterally, good aeration, normal effort. No wheeze/ rhonchi/ rales.  CV: regular rate and rhythm, S1, S2. No murmur, no JVD, no edema. Pedal pulses 2 + bilaterally  Abdomen: soft, nontender, BS x4  Ext: color normal, vascularity normal, temperature normal. No rash.  Less than 1 cm raised scaly lesion on the left forearm consistent " with actinic keratosis, treated with cryotherapy.  Well-tolerated  Neuro: DTR 2+ bilaterally  Assessment and Plan:   The following treatment plan was discussed   1. Pure hypercholesterolemia  Uncontrolled based on history.  Will obtain labs, resume atorvastatin  Lipid Profile    Comp Metabolic Panel    atorvastatin (LIPITOR) 40 MG Tab   2. Elevated hemoglobin A1c   minimal A1c elevation in the past, reevaluate  HEMOGLOBIN A1C   3. Essential hypertension   blood pressure borderline in clinic today.  He had previously done well on low-dose of lisinopril, we will restart this  lisinopril (PRINIVIL) 10 MG Tab   4. Prostate cancer screening  PROSTATE SPECIFIC AG SCREENING   5. Actinic keratosis   treated with cryotherapy, well-tolerated       Educated in proper administration of medication(s) ordered today including safety, possible SE, risks, benefits, rationale and alternatives to therapy.       Followup: Annually and pending labs             Please note that this dictation was created using voice recognition software. I have worked with consultants from the vendor as well as technical experts from [facility] [employer] to optimize the interface. I have made every reasonable attempt to correct obvious errors, but I expect that there are errors of grammar and possibly content that I did not discover before finalizing the note.

## 2024-09-25 ENCOUNTER — OFFICE VISIT (OUTPATIENT)
Dept: SLEEP MEDICINE | Facility: MEDICAL CENTER | Age: 64
End: 2024-09-25
Attending: PHYSICIAN ASSISTANT
Payer: COMMERCIAL

## 2024-09-25 VITALS
OXYGEN SATURATION: 93 % | BODY MASS INDEX: 29.14 KG/M2 | SYSTOLIC BLOOD PRESSURE: 124 MMHG | DIASTOLIC BLOOD PRESSURE: 72 MMHG | RESPIRATION RATE: 16 BRPM | HEIGHT: 66 IN | HEART RATE: 77 BPM | WEIGHT: 181.3 LBS

## 2024-09-25 DIAGNOSIS — G47.33 OSA (OBSTRUCTIVE SLEEP APNEA): ICD-10-CM

## 2024-09-25 PROCEDURE — 99213 OFFICE O/P EST LOW 20 MIN: CPT | Performed by: PHYSICIAN ASSISTANT

## 2024-09-25 PROCEDURE — 3078F DIAST BP <80 MM HG: CPT | Performed by: PHYSICIAN ASSISTANT

## 2024-09-25 PROCEDURE — 3074F SYST BP LT 130 MM HG: CPT | Performed by: PHYSICIAN ASSISTANT

## 2024-09-25 PROCEDURE — 99212 OFFICE O/P EST SF 10 MIN: CPT | Performed by: PHYSICIAN ASSISTANT

## 2024-09-25 ASSESSMENT — ENCOUNTER SYMPTOMS
FEVER: 0
COUGH: 0
SINUS PAIN: 0
PALPITATIONS: 0
SPUTUM PRODUCTION: 0
SHORTNESS OF BREATH: 0
HEARTBURN: 0
INSOMNIA: 1
WHEEZING: 0
HEADACHES: 0
TREMORS: 0
ROS GI COMMENTS: NO DENTURES, NO MISSING TEETH, NO SWALLOWING ISSUES
ORTHOPNEA: 0
DIZZINESS: 0
WEIGHT LOSS: 0
CHILLS: 0
SORE THROAT: 0

## 2024-09-25 NOTE — PATIENT INSTRUCTIONS
1-reviewed sleep study results  2-patient will need auto bipap to adequately manage sleep apnea  3-order placed to [facility]  4-strategies for success   -minimum usage requirement for insurance purposes in the first 90 days of having device is 4 hours per day most days   -therapeutic goal is 6-6.5 hours per day every day  5-short term follow up appointment to review compliance   6-bring entire device to clinic for first follow visit

## 2024-09-25 NOTE — PROGRESS NOTES
"Chief Complaint   Patient presents with    Apnea     Last Office Visit 8/2/24 with Rosario Jules P.A.-C.    Sleep Study Complete on 8/25/24     Polysomnography Titration         HPI:  Camron Mcmahon is a 64 y.o. year old male here today for follow-up on {diagnosis:94880}.    Past Medical History: ***    Vitals:  /72 (BP Location: Left arm, Patient Position: Sitting, BP Cuff Size: Adult)   Pulse 77   Resp 16   Ht 1.676 m (5' 6\")   Wt 82.2 kg (181 lb 4.8 oz)   SpO2 93%     Recent Imaging: ***    Currently using  {brand:26639} {modes:81931} @ ***cm H20 pressure; compliance reviewed for ***, days used ***, average daily usage ***, ***% of days greater than or equal to 4 hours, mask leak at  *** LPM at 95th percentile, AHI *** per hour.    Device obtained ***   DME provider {DME:11108}  Mask interface ***   Polysomnogram ***       Sleep schedule {schedule:51825}  Symptoms {symptoms:31682}    [place] Sleepiness Scale No data recorded   Stop Bang Score No data recorded         Review of Systems   Constitutional:  Positive for malaise/fatigue (tired/sleepy during day). Negative for chills, fever and weight loss.   HENT:  Negative for congestion, hearing loss, nosebleeds, sinus pain, sore throat and tinnitus.    Eyes:         Presc glasses    Respiratory:  Negative for cough, sputum production, shortness of breath and wheezing.    Cardiovascular:  Negative for chest pain, palpitations, orthopnea and leg swelling.   Gastrointestinal:  Negative for heartburn.        No dentures, no missing teeth, no swallowing issues   Neurological:  Negative for dizziness, tremors and headaches.   Psychiatric/Behavioral:  The patient has insomnia (frequent awakenings).        Past Medical History:   Diagnosis Date    Bell's palsy 1980\"s    right face-twitches sometimes still    Hyperlipidemia     Hypertension     Sleep apnea     cpap #12 pressure, no o2    Snoring        Past Surgical History:   Procedure Laterality Date    " MENISCECTOMY, KNEE, MEDIAL Left 9/28/2021    Procedure: LEFT KNEE ARTHROSCOPY PARTIAL MEDIAL MENISCECTOMY, REPAIRS AS INDICATED;  Surgeon: Claude Burt M.D.;  Location: [facility];  Service: Orthopedics    KNEE ARTHROSCOPY  5/9/2013    Performed by Brandan De M.D. at SURGERY [facility]    MENISCECTOMY, KNEE, MEDIAL  5/9/2013    Performed by Brandan De M.D. at SURGERY [facility]       Family History   Problem Relation Age of Onset    Stroke Mother     Hypertension Mother     Hyperlipidemia Mother     Heart Disease Father     Hypertension Brother        Social History     Socioeconomic History    Marital status:      Spouse name: Not on file    Number of children: Not on file    Years of education: Not on file    Highest education level: Not on file   Occupational History    Not on file   Tobacco Use    Smoking status: Never    Smokeless tobacco: Never   Vaping Use    Vaping status: Never Used   Substance and Sexual Activity    Alcohol use: Yes     Comment: beer twice weekly, last dose 1 beer 5/8/13    Drug use: No    Sexual activity: Not on file     Comment:    Other Topics Concern    Not on file   Social History Narrative    Not on file     Social Determinants of Health     Financial Resource Strain: Not on File (8/24/2019)    Received from [facility]    Financial Resource Strain     Financial Resource Strain: 0   Food Insecurity: Not on File (8/24/2019)    Received from [facility]    Food Insecurity     Food: 0   Transportation Needs: Not on File (8/24/2019)    Received from [facility]    Transportation Needs     Transportation: 0   Physical Activity: Not on File (8/24/2019)    Received from [facility]    Physical Activity     Physical Activity: 0   Stress: Not on File (8/24/2019)    Received from [facility]    Stress     Stress: 0   Social Connections: Not on File (8/24/2019)    Received from [facility]    Social Connections     Social Connections and  Isolation: 0   Intimate Partner Violence: Not on file   Housing Stability: Not on File (2019)    Received from [facility]    Housing Stability     Housin       Allergies as of 2024    (No Known Allergies)          Current medications as of today   Current Outpatient Medications   Medication Sig Dispense Refill    metoprolol SR (TOPROL XL) 25 MG TABLET SR 24 HR Take 0.5 Tablets by mouth every day. 45 Tablet 3    lisinopril (PRINIVIL) 10 MG Tab Take 1 Tablet by mouth every day. 90 Tablet 3    rosuvastatin (CRESTOR) 20 MG Tab Take 1 Tablet by mouth every evening. 90 Tablet 3     No current facility-administered medications for this visit.         Physical Exam:   Gen:           Alert and oriented, No apparent distress. Mood and affect appropriate, normal interaction with examiner.   Hearing:     Grossly intact.  Nose:          Normal, no lesions or deformities.  Dentition:    Good dentition.   Oropharynx:   Tongue normal, posterior pharynx without erythema or exudate.  Mallampati Classification: ***  Neck:        Supple, trachea midline, no masses.  Respiratory Effort: No intercostal retractions or use of accessory muscles.   Gait and Station: Normal.  Digits and Nails: No clubbing, cyanosis, petechiae, or nodes.   Skin:        No rashes, lesions or ulcers noted.               Ext:           No cyanosis or edema.      Immunizations:  Flu:***  Pneumovax 23:***  Prevnar 13:***  PCV 20: ***  SARS CoV2 Vaccine: ***, ***    Assessment / Plan:  There are no diagnoses linked to this encounter.      Follow-up:   No follow-ups on file.    Please note that this dictation was created using voice recognition software. I have made every reasonable attempt to correct obvious errors, but it is possible there are errors of grammar and possibly content that I did not discover before finalizing the note.     oriented, No apparent distress. Mood and affect appropriate, normal interaction with examiner.   Hearing:     Grossly intact.  Nose:          Normal, no lesions or deformities.  Dentition:    Good dentition.   Oropharynx:   Tongue normal, posterior pharynx without erythema or exudate.  Mallampati Classification: IV  Neck:        Supple, trachea midline, no masses.  Respiratory Effort: No intercostal retractions or use of accessory muscles.   Gait and Station: Normal.  Digits and Nails: No clubbing, cyanosis, petechiae, or nodes.   Skin:        No rashes, lesions or ulcers noted.               Ext:           No cyanosis or edema.      Immunizations:  Flu: 9/26/2023  Pfizer booster SARS-CoV-2 vaccine: 12/6/2022   Pfizer SARS CoV2 Vaccine: 11/17/2021, 4/15/2021, 3/24/2021  COVID-19 mRNA Moderna vaccine: 9/26/2023    Assessment / Plan:  1. ZACHARY (obstructive sleep apnea)  - DME BiPAP    Reviewed titration sleep study results.  Patient will need auto BiPAP to adequately manage his sleep apnea.  Will send order to [facility] at patient request.  Reviewed strategies for success with new device including minimum usage requirements for insurance purposes in the first 90 days of 4 hours/day most days as well as the therapeutic goal of 6 to 6-1/2 hours/day every day.  Patient is instructed to bring entire device to clinic for first follow-up visit.  Short-term follow-up will be required to document compliance.  Patient states understanding.      Follow-up:   Return in about 14 weeks (around 1/1/2025) for Return with Rosario Jules PA-C.    Please note that this dictation was created using voice recognition software. I have made every reasonable attempt to correct obvious errors, but it is possible there are errors of grammar and possibly content that I did not discover before finalizing the note.

## 2024-10-17 PROCEDURE — RXMED WILLOW AMBULATORY MEDICATION CHARGE: Performed by: INTERNAL MEDICINE

## 2024-10-21 ENCOUNTER — PHARMACY VISIT (OUTPATIENT)
Dept: PHARMACY | Facility: MEDICAL CENTER | Age: 64
End: 2024-10-21
Payer: COMMERCIAL

## 2024-10-23 ENCOUNTER — TELEPHONE (OUTPATIENT)
Dept: SLEEP MEDICINE | Facility: MEDICAL CENTER | Age: 64
End: 2024-10-23
Payer: COMMERCIAL

## 2024-12-23 ENCOUNTER — HOSPITAL ENCOUNTER (OUTPATIENT)
Dept: LAB | Facility: MEDICAL CENTER | Age: 64
End: 2024-12-23
Attending: INTERNAL MEDICINE
Payer: COMMERCIAL

## 2024-12-23 DIAGNOSIS — I10 ESSENTIAL HYPERTENSION: Chronic | ICD-10-CM

## 2024-12-23 DIAGNOSIS — R73.03 PREDIABETES: Chronic | ICD-10-CM

## 2024-12-23 DIAGNOSIS — E55.9 VITAMIN D DEFICIENCY: ICD-10-CM

## 2024-12-23 LAB
EST. AVERAGE GLUCOSE BLD GHB EST-MCNC: 120 MG/DL
HBA1C MFR BLD: 5.8 % (ref 4–5.6)

## 2024-12-23 PROCEDURE — 80061 LIPID PANEL: CPT

## 2024-12-23 PROCEDURE — 84443 ASSAY THYROID STIM HORMONE: CPT

## 2024-12-23 PROCEDURE — 83036 HEMOGLOBIN GLYCOSYLATED A1C: CPT

## 2024-12-23 PROCEDURE — 82306 VITAMIN D 25 HYDROXY: CPT

## 2024-12-23 PROCEDURE — 36415 COLL VENOUS BLD VENIPUNCTURE: CPT

## 2024-12-24 LAB
25(OH)D3 SERPL-MCNC: 37 NG/ML (ref 30–100)
CHOLEST SERPL-MCNC: 168 MG/DL (ref 100–199)
HDLC SERPL-MCNC: 45 MG/DL
LDLC SERPL CALC-MCNC: 98 MG/DL
TRIGL SERPL-MCNC: 127 MG/DL (ref 0–149)
TSH SERPL DL<=0.005 MIU/L-ACNC: 2.98 UIU/ML (ref 0.38–5.33)

## 2024-12-31 ENCOUNTER — TELEPHONE (OUTPATIENT)
Dept: SLEEP MEDICINE | Facility: MEDICAL CENTER | Age: 64
End: 2024-12-31
Payer: COMMERCIAL

## 2025-01-02 ENCOUNTER — OFFICE VISIT (OUTPATIENT)
Dept: SLEEP MEDICINE | Facility: MEDICAL CENTER | Age: 65
End: 2025-01-02
Attending: PHYSICIAN ASSISTANT
Payer: COMMERCIAL

## 2025-01-02 VITALS
BODY MASS INDEX: 30.62 KG/M2 | RESPIRATION RATE: 16 BRPM | WEIGHT: 190.5 LBS | OXYGEN SATURATION: 95 % | HEART RATE: 80 BPM | SYSTOLIC BLOOD PRESSURE: 118 MMHG | DIASTOLIC BLOOD PRESSURE: 78 MMHG | HEIGHT: 66 IN

## 2025-01-02 DIAGNOSIS — G47.33 OSA (OBSTRUCTIVE SLEEP APNEA): ICD-10-CM

## 2025-01-02 PROCEDURE — 3078F DIAST BP <80 MM HG: CPT | Performed by: PHYSICIAN ASSISTANT

## 2025-01-02 PROCEDURE — 99213 OFFICE O/P EST LOW 20 MIN: CPT | Performed by: PHYSICIAN ASSISTANT

## 2025-01-02 PROCEDURE — 3074F SYST BP LT 130 MM HG: CPT | Performed by: PHYSICIAN ASSISTANT

## 2025-01-02 ASSESSMENT — ENCOUNTER SYMPTOMS
PALPITATIONS: 0
COUGH: 0
CHILLS: 0
FEVER: 0
ORTHOPNEA: 0
ROS GI COMMENTS: NO DENTURES, NO MISSING TEETH, NO SWALLOWING ISSUES
SINUS PAIN: 0
SPUTUM PRODUCTION: 0
TREMORS: 0
HEADACHES: 0
WHEEZING: 0
DIZZINESS: 0
SHORTNESS OF BREATH: 0
INSOMNIA: 0
WEIGHT LOSS: 0
HEARTBURN: 0
SORE THROAT: 0

## 2025-01-02 NOTE — PROGRESS NOTES
"Chief Complaint   Patient presents with    Apnea     Last Office Visit 9/25/24 with Rosario Jules P.A.-C.    1st Compliance: Yes    PAP/O2/OAT: Auto BIPAP  Max IPAP (cmH2O): 18  Min EPAP (cmH2O): 11  PS: 4       HPI:  Camron Mcmahon is a 64 y.o. year old male here today for follow-up on ZACHARY and first compliance.  Last seen in clinic 9/25/2024 by URMILA Regalado.  Previously evaluated by Dr. Cindy Veloz 5/1/2024.    Past Medical History: Essential hypertension, ZACHARY on CPAP, PVCs, irregular heart rate, Bell's palsy, sinus bradycardia, excessive daytime sleepiness. Patient works as a  in the afternoons and evenings.     Vitals:  /78 (BP Location: Left arm, Patient Position: Sitting, BP Cuff Size: Adult)   Pulse 80   Resp 16   Ht 1.676 m (5' 6\")   Wt 86.4 kg (190 lb 8 oz)   SpO2 95%     Recent Imaging: None    Currently using  Resmed auto Bi-PAP @18/11/4 cm H20 pressure; compliance reviewed for 11/11/20/2024 through 12/10/2024, days used 30/30, average daily usage 8 hours 22 minutes, 100% of days greater than or equal to 4 hours, mask leak at 26.2 LPM at 95th percentile, AHI 2.2 per hour.  See media for full report.    Device obtained 11/11/2024  DME provider [facility]   Mask interface fullface mask    Polysomnogram study obtained 6/16/2024 demonstrated findings consistent with severe obstructive sleep apnea with overall AHI of 100.43, in supine position AHI increased to 108.84. Severely low oxygen saturation was also noted at 59% with sats less than or equal to 88 for 38.9 minutes of total sleep time. Also noted PLMS index of 53.6. Given severity of sleep apnea and nocturnal oxygen desaturation patient will need an overnight titration study. Patient may need supplemental O2.      Dedicated titration study obtained 8/25/2024 demonstrating trial of both CPAP and BiPAP, respiratory events improved with both however best response to auto BiPAP including decreased limb movements with recommendation " "for auto BiPAP at 18/11/4 cm H2O pressure.  Oxygen saturations were maintained above 90% throughout study.  Tolerated large solo mask.       Sleep schedule goes to bed 12 AM, wakens 7:30 AM , and gets up during the night bathroom x 1   Symptoms denies day time somnolence and denies morning headache    [place] Sleepiness Scale reported as 16/24 on 5/21/2024      Review of Systems   Constitutional:  Positive for malaise/fatigue (mild). Negative for chills, fever and weight loss.   HENT:  Positive for congestion. Negative for hearing loss, nosebleeds, sinus pain, sore throat and tinnitus.    Eyes:         Presc glasses   Respiratory:  Negative for cough, sputum production, shortness of breath and wheezing.    Cardiovascular:  Negative for chest pain, palpitations, orthopnea and leg swelling.   Gastrointestinal:  Negative for heartburn.        No dentures, no missing teeth, no swallowing issues    Neurological:  Negative for dizziness, tremors and headaches.   Psychiatric/Behavioral:  The patient does not have insomnia.        Past Medical History:   Diagnosis Date    Bell's palsy 1980\"s    right face-twitches sometimes still    Hyperlipidemia     Hypertension     Sleep apnea     cpap #12 pressure, no o2    Snoring        Past Surgical History:   Procedure Laterality Date    MENISCECTOMY, KNEE, MEDIAL Left 9/28/2021    Procedure: LEFT KNEE ARTHROSCOPY PARTIAL MEDIAL MENISCECTOMY, REPAIRS AS INDICATED;  Surgeon: Claude Burt M.D.;  Location: [facility] [place];  Service: Orthopedics    KNEE ARTHROSCOPY  5/9/2013    Performed by Brandan De M.D. at SURGERY [facility]    MENISCECTOMY, KNEE, MEDIAL  5/9/2013    Performed by Brandan De M.D. at SURGERY [facility]       Family History   Problem Relation Age of Onset    Stroke Mother     Hypertension Mother     Hyperlipidemia Mother     Heart Disease Father     Hypertension Brother        Social History     Socioeconomic History    Marital " status:      Spouse name: Not on file    Number of children: Not on file    Years of education: Not on file    Highest education level: Not on file   Occupational History    Not on file   Tobacco Use    Smoking status: Never    Smokeless tobacco: Never   Vaping Use    Vaping status: Never Used   Substance and Sexual Activity    Alcohol use: Yes     Comment: beer twice weekly, last dose 1 beer 13    Drug use: No    Sexual activity: Not on file     Comment:    Other Topics Concern    Not on file   Social History Narrative    Not on file     Social Drivers of Health     Financial Resource Strain: Not on File (2019)    Received from [facility]    Financial Resource Strain     Financial Resource Strain: 0   Food Insecurity: Not on File (2019)    Received from [facility]    Food Insecurity     Food: 0   Transportation Needs: Not on File (2019)    Received from [facility]    Transportation Needs     Transportation: 0   Physical Activity: Not on File (2019)    Received from [facility]    Physical Activity     Physical Activity: 0   Stress: Not on File (2019)    Received from [facility]    Stress     Stress: 0   Social Connections: Not on File (2019)    Received from [facility]    Social Connections     Social Connections and Isolation: 0   Intimate Partner Violence: Not on file   Housing Stability: Not on File (2019)    Received from [facility]    Housing Stability     Housin       Allergies as of 2025    (No Known Allergies)          Current medications as of today   Current Outpatient Medications   Medication Sig Dispense Refill    influenza vaccine High-Dose (FLUZONE HIGH-DOSE) 0.5 ML Suspension Prefilled Syringe injection Inject 0.5 mL into the shoulder, thigh, or buttocks. 0.5 mL 0    COVID-19 mRNA Vac-Ann Marie,Pfizer, (COMIRNATY) 30 MCG/0.3ML Suspension Prefilled Syringe injection Inject 0.3 mL into the shoulder, thigh, or buttocks. 0.3 mL 0     pneumococcal 20-Bella Conj Vacc (PREVNAR 20) 0.5 ML Suspension Prefilled Syringe syringe Inject 0.5 mL into the shoulder, thigh, or buttocks. 0.5 mL 0    metoprolol SR (TOPROL XL) 25 MG TABLET SR 24 HR Take 0.5 Tablets by mouth every day. 45 Tablet 3    lisinopril (PRINIVIL) 10 MG Tab Take 1 Tablet by mouth every day. 90 Tablet 3    rosuvastatin (CRESTOR) 20 MG Tab Take 1 Tablet by mouth every evening. 90 Tablet 3     No current facility-administered medications for this visit.         Physical Exam:   Gen:           Alert and oriented, No apparent distress. Mood and affect appropriate, normal interaction with examiner.   Hearing:     Grossly intact.  Nose:          Normal, no lesions or deformities.  Dentition:    Good dentition.   Oropharynx:   Tongue normal, posterior pharynx without erythema or exudate.  Mallampati Classification: IV  Neck:        Supple, trachea midline, no masses.  Respiratory Effort: No intercostal retractions or use of accessory muscles.   Gait and Station: Normal.  Digits and Nails: No clubbing, cyanosis, petechiae, or nodes.   Skin:        No rashes, lesions or ulcers noted.               Ext:           No cyanosis or edema.      Immunizations:  Flu: 10/15/2024  PCV 20: 10/21/2024  Pfizer booster SARS-CoV-2 vaccine: 12/6/2022  Pfizer SARS CoV2 Vaccine: 11/17/2021, 4/15/2021, 3/24/2021  COVID-19 mRNA vaccine: 9/26/2023    Assessment / Plan:  1. ZACHARY (obstructive sleep apnea)  - DME Mask and Supplies    Reviewed first compliance with new device, demonstrating use and benefit, meeting all first compliance requirements.  Mild mask leak noted with preferred use of nasal mask but did not tolerate chinstrap.  Consider side sleep.  Patient was reminded to use distilled water only in humidifier chamber.  Reviewed equipment cleaning as well as equipment replacement schedule.  Follow-up in 1 year, sooner if needed.      Follow-up:   Return in about 1 year (around 1/2/2026) for Return with Rosario Jules  KEITH.    Please note that this dictation was created using voice recognition software. I have made every reasonable attempt to correct obvious errors, but it is possible there are errors of grammar and possibly content that I did not discover before finalizing the note.

## 2025-01-02 NOTE — PATIENT INSTRUCTIONS
1-reviewed first compliance with new device  2-demonstrating use and benefit  3-meeting all first compliance requirements  4-mild mask leak with nasal mask but did not tolerate chin strap  5-side sleep advised  6-As a reminder use distilled water only in humidifier chamber.  Fresh fill daily  7-Today we reviewed equipment cleaning  once weekly minimum  mask, tubing and water chamber  use dedicated container  use mild soap and water  SoClean or other ozone  are not recommended  white vinegar and water solution is no longer recommended  hang tubing to dry  mask sanitizing wipes are an option for use   8-Equipment replacement schedule : Nasal pillows 2 times per month, Head gear every 6 months, Tubing every 3 months, Ultra-fine filters 2 times per month, Humidifier chamber every 6 months  9-follow up in one year sooner if needed

## 2025-01-14 DIAGNOSIS — E78.00 PURE HYPERCHOLESTEROLEMIA: ICD-10-CM

## 2025-01-14 DIAGNOSIS — I10 ESSENTIAL HYPERTENSION: ICD-10-CM

## 2025-01-14 DIAGNOSIS — I49.3 PVC'S (PREMATURE VENTRICULAR CONTRACTIONS): ICD-10-CM

## 2025-01-15 RX ORDER — METOPROLOL SUCCINATE 25 MG/1
12.5 TABLET, EXTENDED RELEASE ORAL DAILY
Qty: 15 TABLET | Refills: 11 | Status: SHIPPED | OUTPATIENT
Start: 2025-01-15

## 2025-01-15 RX ORDER — ROSUVASTATIN CALCIUM 20 MG/1
20 TABLET, COATED ORAL EVERY EVENING
Qty: 30 TABLET | Refills: 11 | Status: SHIPPED | OUTPATIENT
Start: 2025-01-15

## 2025-01-15 RX ORDER — LISINOPRIL 10 MG/1
10 TABLET ORAL DAILY
Qty: 90 TABLET | Refills: 3 | Status: SHIPPED | OUTPATIENT
Start: 2025-01-15

## 2025-02-18 ENCOUNTER — OFFICE VISIT (OUTPATIENT)
Dept: MEDICAL GROUP | Age: 65
End: 2025-02-18
Payer: COMMERCIAL

## 2025-02-18 VITALS
BODY MASS INDEX: 30.7 KG/M2 | SYSTOLIC BLOOD PRESSURE: 132 MMHG | HEART RATE: 82 BPM | WEIGHT: 191 LBS | TEMPERATURE: 98.2 F | OXYGEN SATURATION: 97 % | RESPIRATION RATE: 16 BRPM | DIASTOLIC BLOOD PRESSURE: 76 MMHG | HEIGHT: 66 IN

## 2025-02-18 DIAGNOSIS — Z00.00 ANNUAL PHYSICAL EXAM: Primary | ICD-10-CM

## 2025-02-18 DIAGNOSIS — R73.03 PREDIABETES: ICD-10-CM

## 2025-02-18 DIAGNOSIS — Z12.83 SKIN CANCER SCREENING: ICD-10-CM

## 2025-02-18 DIAGNOSIS — I10 ESSENTIAL HYPERTENSION: Chronic | ICD-10-CM

## 2025-02-18 DIAGNOSIS — E78.00 PURE HYPERCHOLESTEROLEMIA: ICD-10-CM

## 2025-02-18 DIAGNOSIS — Z12.5 PROSTATE CANCER SCREENING: ICD-10-CM

## 2025-02-18 DIAGNOSIS — Z23 NEED FOR VACCINATION: ICD-10-CM

## 2025-02-18 PROCEDURE — 90471 IMMUNIZATION ADMIN: CPT | Performed by: INTERNAL MEDICINE

## 2025-02-18 PROCEDURE — 3075F SYST BP GE 130 - 139MM HG: CPT | Performed by: INTERNAL MEDICINE

## 2025-02-18 PROCEDURE — 90746 HEPB VACCINE 3 DOSE ADULT IM: CPT | Performed by: INTERNAL MEDICINE

## 2025-02-18 PROCEDURE — 99397 PER PM REEVAL EST PAT 65+ YR: CPT | Mod: 25 | Performed by: INTERNAL MEDICINE

## 2025-02-18 PROCEDURE — 3078F DIAST BP <80 MM HG: CPT | Performed by: INTERNAL MEDICINE

## 2025-02-18 ASSESSMENT — PATIENT HEALTH QUESTIONNAIRE - PHQ9: CLINICAL INTERPRETATION OF PHQ2 SCORE: 0

## 2025-02-18 NOTE — PROGRESS NOTES
Subjective:     CC:   Chief Complaint   Patient presents with    Annual Exam     Verbal consent was acquired by the patient to use Fitbit ambient listening note generation during this visit Yes     History of Present Illness  Camron is a pleasant 64-year-old male who presents for an annual preventative visit.    He maintains a diet that includes carbohydrates but lacks regularity due to his work schedule. His intake of fruits and vegetables is insufficient. He engages in physical activities such as skiing, yard work, and walking his dogs for 2 miles daily. He spends approximately 14 hours standing each day. He has no history of smoking.     He reports no family history of colon, prostate, or breast cancer.     He does not monitor his blood pressure at home. He has received his influenza, COVID-19, pneumonia, and hepatitis B vaccines. He is interested in receiving the RSV vaccine. He is scheduled for a colonoscopy in 2026. He consumes minimal candy and soda. He drinks 2 to 3 beers per week. He declines HIV and chlamydia screenings. He has never consulted a dermatologist. He uses sunscreen when outdoors. He experiences daily bowel movements without any issues of diarrhea or constipation. He undergoes annual vision checks.     He is currently on lisinopril for hypertension management.    He is also on metoprolol, half a tablet, which effectively controls his palpitations.    He reports experiencing dry eyes and uses a humidifier at home.    Supplemental Information  He reports mild swelling and soreness in his lower back due to a recent work-related injury. He has a history of lower back issues but has been symptom-free for the past 20 years.    SOCIAL HISTORY  He does not smoke. He drinks 2 to 3 beers a week.    FAMILY HISTORY  He reports no family history of cancer, including colon, prostate, and breast cancer.      Health Maintenance  PT/vit D for falls prevention: not currently  Cholesterol Screening:  "2024  Diabetes Screening: prediabetes   Aspirin Use: n.a    Diet: discussed   Exercise: works in the yard a lot, walking his dog, skiing   Substance Abuse: none     Safe in relationship.    Seat belts, bike helmet, gun safety discussed.  Sun protection used.     Cancer screening\"   Colorectal Cancer Screening: Mar 1, 2021, due 2026   Lung Cancer Screening:  n/a   Prostate Cancer Screening/PSA: 2023     Infectious disease screening/Immunizations  --STI Screening: deferred   --Practices safe sex.  --HIV Screening: deferred   --Hepatitis C Screening: UTD   --Immunizations:               Influenza: yearly               Tetanus: 2016              Shingles: UTD                COVID-19: UTD   Hep B : Not immune, will receive third dose in office today    He  has a past medical history of Bell's palsy (1980\"s), Hyperlipidemia, Hypertension, Sleep apnea, and Snoring.  He  has a past surgical history that includes knee arthroscopy (5/9/2013); meniscectomy, knee, medial (5/9/2013); and meniscectomy, knee, medial (Left, 9/28/2021).  Family History   Problem Relation Age of Onset    Stroke Mother     Hypertension Mother     Hyperlipidemia Mother     Heart Disease Father     Hypertension Brother      Social History     Tobacco Use    Smoking status: Never    Smokeless tobacco: Never   Vaping Use    Vaping status: Never Used   Substance Use Topics    Alcohol use: Yes     Comment: beer twice weekly, last dose 1 beer 5/8/13    Drug use: No       Patient Active Problem List    Diagnosis Date Noted    PVC's (premature ventricular contractions) 12/27/2023    Irregular heart rate 12/04/2023    Sinus bradycardia 12/04/2023    Preventative health care 12/09/2022    Erectile dysfunction 12/09/2022    History of colonic polyps 12/09/2021    Sleep apnea 12/08/2021    Overweight (BMI 25.0-29.9) 12/04/2021    Complex tear of medial meniscus of left knee 07/29/2021    Injury of clavicle, initial encounter 11/05/2020    Pure hypercholesterolemia " "09/25/2019    Essential hypertension 09/25/2019    Actinic keratosis 09/25/2019    Prediabetes 09/25/2019    Osteoarthrosis involving lower leg 05/09/2013       Current Outpatient Medications   Medication Sig Dispense Refill    rosuvastatin (CRESTOR) 20 MG Tab TAKE 1 TABLET BY MOUTH EVERY DAY IN THE EVENING 30 Tablet 11    metoprolol SR (TOPROL XL) 25 MG TABLET SR 24 HR TAKE 1/2 TABLET BY MOUTH DAILY 15 Tablet 11    lisinopril (PRINIVIL) 10 MG Tab TAKE 1 TABLET BY MOUTH EVERY DAY 90 Tablet 3     No current facility-administered medications for this visit.    (including changes today)      Allergies: Patient has no known allergies.    Review of Systems   Constitutional: Negative for fever, chills and malaise/fatigue.   HENT: Negative for congestion.    Eyes: Negative for pain.   Respiratory: Negative for cough and shortness of breath.    Cardiovascular: Negative for leg swelling.   Gastrointestinal: Negative for nausea, vomiting, abdominal pain and diarrhea.   Genitourinary: Negative for dysuria and hematuria.   Skin: Negative for rash.   Neurological: Negative for dizziness, focal weakness and headaches.   Endo/Heme/Allergies: Does not bleed easily.   Psychiatric/Behavioral: Negative for depression.  The patient is not nervous/anxious.      Objective:     /76 (BP Location: Left arm, Patient Position: Sitting, BP Cuff Size: Adult)   Pulse 82   Temp 36.8 °C (98.2 °F) (Temporal)   Resp 16   Ht 1.68 m (5' 6.14\")   Wt 86.6 kg (191 lb)   SpO2 97%   BMI 30.70 kg/m²   Body mass index is 30.7 kg/m².  Wt Readings from Last 4 Encounters:   02/18/25 86.6 kg (191 lb)   01/02/25 86.4 kg (190 lb 8 oz)   09/25/24 82.2 kg (181 lb 4.8 oz)   08/02/24 81.6 kg (180 lb)       Physical Exam  Constitutional:       Appearance: Normal appearance.   HENT:      Head: Normocephalic and atraumatic.      Right Ear: Tympanic membrane and ear canal normal. There is no impacted cerumen.      Left Ear: Tympanic membrane and ear canal " "normal. There is no impacted cerumen.      Nose: Nose normal. No congestion.      Mouth/Throat:      Mouth: Mucous membranes are moist.      Pharynx: Oropharynx is clear.   Eyes:      Conjunctiva/sclera:      Right eye: Right conjunctiva is injected.      Left eye: Left conjunctiva is injected.      Pupils: Pupils are equal, round, and reactive to light.   Cardiovascular:      Rate and Rhythm: Normal rate and regular rhythm.      Pulses: Normal pulses.      Heart sounds: Normal heart sounds. No murmur heard.  Pulmonary:      Effort: Pulmonary effort is normal. No respiratory distress.      Breath sounds: Normal breath sounds. No wheezing.   Abdominal:      General: Abdomen is flat. There is no distension.      Palpations: Abdomen is soft.      Tenderness: There is no abdominal tenderness.   Musculoskeletal:         General: No deformity.      Cervical back: Normal range of motion.      Right lower leg: No edema.      Left lower leg: No edema.   Skin:     General: Skin is warm.      Capillary Refill: Capillary refill takes less than 2 seconds.   Neurological:      General: No focal deficit present.      Mental Status: He is alert and oriented to person, place, and time.   Psychiatric:         Mood and Affect: Mood normal.         Behavior: Behavior normal.         Thought Content: Thought content normal.         Judgment: Judgment normal.       Assessment and Plan:     Assessment & Plan  1. Annual preventive visit.  He was advised to receive the RSV vaccine at a pharmacy. A referral for a dermatology consultation was made for a skin check. He was advised to take omega-3 supplements such as fish oil or krill oil for his dry eyes.  Obtain PSA for prostate cancer screening.    2. Prediabetes.  His A1c has remained at 5.8% for the last three evaluations. He was advised to read or listen to the book \"How to Prevent and Reverse Diabetes\" for dietary and lifestyle modification strategies. A blood sugar test will be ordered " for his next visit in 6 months.    3. Prostate cancer screening.  He agreed to continue annual PSA testing. The next PSA test will be done concurrently with his other labs.    4. Need for vaccination.  He will receive his third and final dose of the hepatitis B vaccine today. He was advised to get the RSV vaccine at a pharmacy since it is not available in the office.    5. Hypertension.  He was advised to monitor his blood pressure at home twice weekly.    6. Dry eyes.  He was advised to take omega-3 supplements such as fish oil or krill oil for his dry eyes.    Problem List Items Addressed This Visit       Essential hypertension (Chronic)    Prediabetes (Chronic)    Relevant Orders    HEMOGLOBIN A1C    Pure hypercholesterolemia     Other Visit Diagnoses         Annual physical exam    -  Primary    Relevant Orders    PROSTATE SPECIFIC AG SCREENING      Prostate cancer screening          Need for vaccination        Relevant Orders    Hep B Adult 20+ (Completed)      Skin cancer screening        Relevant Orders    Referral to Dermatology          HCM: as above .  Labs per orders.  Vaccinations per orders.  Counseling about diet, supplements, exercise, skin care and safe sex.    Follow-up: Follow-up  The patient is scheduled for a follow-up visit in 08/2025.    Please note that this dictation was created using voice recognition software. I have made every reasonable attempt to correct obvious errors, but I expect that there are errors of grammar and possibly content that I did not discover before finalizing the note.

## 2025-02-18 NOTE — Clinical Note
REFERRAL APPROVAL NOTICE         Sent on February 18, 2025                   Camron Lisandro  [address] [place] [address]                   Dear Mr. Mcmahon,    After a careful review of the medical information and benefit coverage, [facility] has processed your referral. See below for additional details.    If applicable, you must be actively enrolled with your insurance for coverage of the authorized service. If you have any questions regarding your coverage, please contact your insurance directly.    REFERRAL INFORMATION   Referral #:  [id number]  Referred-To Department    Referred-By Provider:  Dermatology    Alejandra Chang M.D.   [facility]      [address] [place] [id number] [phone] [address]  [place] [id number] [phone]    Referral Start Date:  02/18/2025  Referral End Date:   02/18/2026             SCHEDULING  If you do not already have an appointment, please call [phone] to make an appointment.     MORE INFORMATION  If you do not already have a [email or web address] account, sign up at: [email or web address]  You can access your medical information, make appointments, see lab results, billing information, and more.  If you have questions regarding this referral, please contact  the [facility] Referrals department at:             [phone]. Monday - Friday 8:00AM - 5:00PM.     Sincerely,    [facility]

## 2025-02-27 ENCOUNTER — NON-PROVIDER VISIT (OUTPATIENT)
Dept: URGENT CARE | Facility: CLINIC | Age: 65
End: 2025-02-27

## 2025-02-27 ENCOUNTER — OCCUPATIONAL MEDICINE (OUTPATIENT)
Dept: URGENT CARE | Facility: CLINIC | Age: 65
End: 2025-02-27
Payer: COMMERCIAL

## 2025-02-27 ENCOUNTER — APPOINTMENT (OUTPATIENT)
Dept: RADIOLOGY | Facility: IMAGING CENTER | Age: 65
End: 2025-02-27
Payer: COMMERCIAL

## 2025-02-27 VITALS
WEIGHT: 191 LBS | SYSTOLIC BLOOD PRESSURE: 146 MMHG | RESPIRATION RATE: 16 BRPM | BODY MASS INDEX: 30.7 KG/M2 | DIASTOLIC BLOOD PRESSURE: 98 MMHG | OXYGEN SATURATION: 98 % | HEIGHT: 66 IN | HEART RATE: 60 BPM | TEMPERATURE: 97.1 F

## 2025-02-27 DIAGNOSIS — Z02.83 ENCOUNTER FOR DRUG SCREENING: Primary | ICD-10-CM

## 2025-02-27 DIAGNOSIS — M54.50 ACUTE MIDLINE LOW BACK PAIN WITHOUT SCIATICA: ICD-10-CM

## 2025-02-27 DIAGNOSIS — I10 ELEVATED BLOOD PRESSURE READING WITH DIAGNOSIS OF HYPERTENSION: ICD-10-CM

## 2025-02-27 LAB
AMP AMPHETAMINE: NORMAL
BREATH ALCOHOL COMMENT: NORMAL
COC COCAINE: NORMAL
INT CON NEG: NEGATIVE
INT CON POS: POSITIVE
MET METHAMPHETAMINES: NORMAL
OPI OPIATES: NORMAL
PCP PHENCYCLIDINE: NORMAL
POC BREATHALIZER: 0 PERCENT (ref 0–0.01)
POC DRUG COMMENT 753798-OCCUPATIONAL HEALTH: NORMAL
THC: NORMAL

## 2025-02-27 PROCEDURE — 72100 X-RAY EXAM L-S SPINE 2/3 VWS: CPT | Mod: TC | Performed by: RADIOLOGY

## 2025-02-27 RX ORDER — CYCLOBENZAPRINE HCL 5 MG
5-10 TABLET ORAL
Qty: 15 TABLET | Refills: 0 | Status: SHIPPED | OUTPATIENT
Start: 2025-02-27

## 2025-02-27 RX ORDER — LIDOCAINE 4 G/G
PATCH TOPICAL
Qty: 15 PATCH | Refills: 0 | Status: SHIPPED | OUTPATIENT
Start: 2025-02-27

## 2025-02-27 NOTE — LETTER
"    EMPLOYEE’S CLAIM FOR COMPENSATION/ REPORT OF INITIAL TREATMENT  FORM C-4  PLEASE TYPE OR PRINT    EMPLOYEE’S CLAIM - PROVIDE ALL INFORMATION REQUESTED   First Name                    EMILY Lyon                  Last Name  Lisandro Birthdate                    1960                Sex  Male Claim Number (Insurer’s Use Only)     Home Address  [address] [place] Age  64 y.o. Height  1.676 m (5' 6\") Weight  86.6 kg (191 lb) Social Security Number     [facility] Zip  [address] Telephone  There are no phone numbers on file.   Mailing Address  [address] [place] [facility] Zip  [address] Primary Language Spoken  English    INSURER   THIRD-PARTY   [employer]   Employee's Occupation (Job Title) When Injury or Occupational Disease Occurred      Employer's Name/Company Name  [employer]  Telephone  [phone]    Office Mail Address (Number and Street)  [address] [place]     Date of Injury (if applicable) 2/15/2025               Hours Injury (if applicable)  4:30 PM Date Employer Notified  2/18/2025 Last Day of Work after Injury or Occupational Disease  2/27/2025 Supervisor to Whom Injury Reported  Lm Barriga   Address or Location of Accident (if applicable)  Work [1]   What were you doing at the time of accident? (if applicable)  Doing Orders.    How did this injury or occupational disease occur? (Be specific and answer in detail. Use additional sheet if necessary)  WAlkED into FREEZER And Steped into Ice SHAViNGS on Floor AND SlippED   If you believe that you have an occupational disease, when did you first have knowledge of the disability and its relationship to your employment?  N/A Witnesses to the Accident (if applicable)  NONE      Nature of Injury or Occupational Disease  Strain  Part(s) of Body Injured or Affected  Lower Back Area (Lumbar Area & Lumbo-Sacral) N/A N/A    I CERTIFY THAT " THE ABOVE IS TRUE AND CORRECT TO T HE BEST OF MY KNOWLEDGE AND THAT I HAVE PROVIDED THIS INFORMATION IN ORDER TO OBTAIN THE BENEFITS OF [place]CC[place], OR [place]).  I HEREBY AUTHORIZE ANY PHYSICIAN, CHIROPRACTOR, SURGEON, PRACTITIONER OR ANY OTHER PERSON, ANY HOSPITAL, INCLUDING [facility] OR [facility], ANY  MEDICAL SERVICE ORGANIZATION, ANY INSURANCE COMPANY, OR OTHER INSTITUTION OR ORGANIZATION TO RELEASE TO EACH OTHER, ANY MEDICAL OR OTHER INFORMATION, INCLUDING BENEFITS PAID OR PAYABLE, PERTINENT TO THIS INJURY OR DISEASE, EXCEPT INFORMATION RELATIVE TO DIAGNOSIS, TREATMENT AND/OR COUNSELING FOR AIDS, PSYCHOLOGICAL CONDITIONS, ALCOHOL OR CONTROLLED SUBSTANCES, FOR WHICH I MUST GIVE SPECIFIC AUTHORIZATION.  A PHOTOSTAT OF THIS AUTHORIZATION SHALL BE VALID AS THE ORIGINAL.      Date 2/27/2025   Place [facility] Employee’s Original or  *Electronic Signature   THIS REPORT MUST BE COMPLETED AND MAILED WITHIN 3 WORKING DAYS OF TREATMENT   Place  [facility]    Name of [facility]   Date 2/27/2025 Diagnosis and Description of Injury or Occupational Disease  (M54.50) Acute midline low back pain without sciatica  (I10) Elevated blood pressure reading with diagnosis of hypertension  Diagnoses of Acute midline low back pain without sciatica and Elevated blood pressure reading with diagnosis of hypertension were pertinent to this visit. Is there evidence that the injured employee was under the influence of alcohol and/or another controlled substance at the time of accident?  []No  [] Yes (if yes, please explain)   Hour 9:38 AM  No   Treatment: Patient at this time was instructed to start over-the-counter analgesics along with use of Flexeril and lidocaine patches as needed for symptoms.  Patient also instructed to use heating pad and gentle range of motion exercises.    Have you advised the patient  to remain off work five days or more?   [] Yes  [] No        No           If yes, indicate dates: From   To      If no, is the injured employee capable of: [] full duty No   [] modified duty Yes  If yes to modified duty, specify any limitations / restrictions:   Please allow Patient to take breaks as needed for symptoms   X-Ray Findings: Negative  Comments:degenerative changes to lumber spine    From information given by the employee, together with medical evidence, can you directly connect this injury or occupational disease as job incurred?  []Yes   [] No Yes    Is additional medical care by a physician indicated? []Yes [] No  Yes    Do you know of any previous injury or disease contributing to this condition or occupational disease? []Yes [] No (Explain if yes)                          No   Date  2/27/2025 Print Health Care Provider’s Name  ELOY Solis I certify that the employer’s copy of  this form was delivered to the employer on:   Address  [address] [place] [address] INSURER'S USE ONLY                       [facility]  [address] Provider’s Tax ID Number  [id number]   Telephone  Dept: [phone]    Health Care Provider’s Original or Electronic Signature  e-DELL Delarosa Degree (MD,DO, DC,PA-C,APRN)  APRN  Choose (if applicable)      ORIGINAL - TREATING HEALTHCARE PROVIDER PAGE 2 - INSURER/TPA PAGE 3 - EMPLOYER PAGE 4 - EMPLOYEE             Form C-4 (rev.08/23)

## 2025-02-27 NOTE — LETTER
PHYSICIAN’S AND CHIROPRACTIC PHYSICIAN'S   PROGRESS REPORT   CERTIFICATION OF DISABILITY Claim Number:     Social Security Number:    Patient’s Name: Camron Mcmahon Date of Injury: 2/15/2025   Employer: [employer] Name of [employer] (if applicable):      Patient’s Job Description/Occupation:        Previous Injuries/Diseases/Surgeries Contributing to the Condition:  Patient reports previous low back injury and was told that he may have bulging disc.      Diagnosis: (M54.50) Acute midline low back pain without sciatica  (I10) Elevated blood pressure reading with diagnosis of hypertension      Related to the Industrial Injury? Yes     Explain: DOI: 2/15/2025    Patient reports that he was going into a walk-in freezer when he slipped causing his right leg to go forward patient was able to catch himself before he fell but he developed immediate pain to his mid low back.  Patient denies any radiation of pain.  Patient denies any numbness or tingling.  Patient has had no incontinence or fevers.  Patient reports that this pain is worse when sitting but better with standing.  Patient reports that the pain has not worsened but has remained unchanged.  Patient has not been taking any medications for this.  Patient does report a history of possible bulging discs of lumbar spine from a previous injury in the past.  Patient reports that symptoms did resolve without any significant treatment at that time.       Objective Medical Findings: Physical Exam  Vitals and nursing note reviewed.   Constitutional:       General: He is not in acute distress.     Appearance: Normal appearance. He is not ill-appearing.   HENT:      Head: Normocephalic and atraumatic.      Right Ear: Tympanic membrane normal.      Left Ear: Tympanic membrane normal.      Nose: Nose normal.      Mouth/Throat:      Mouth: Mucous membranes are moist.      Pharynx: Oropharynx is clear.   Eyes:      Conjunctiva/sclera: Conjunctivae normal.      Pupils:  Pupils are equal, round, and reactive to light.   Cardiovascular:      Rate and Rhythm: Normal rate.      Heart sounds: Normal heart sounds.   Pulmonary:      Effort: Pulmonary effort is normal.      Breath sounds: Normal breath sounds.   Abdominal:      General: Abdomen is flat.      Palpations: Abdomen is soft.   Musculoskeletal:      Lumbar back: Spasms present. No swelling, edema, deformity, signs of trauma, lacerations, tenderness or bony tenderness. Decreased range of motion.   Skin:     General: Skin is warm and dry.      Capillary Refill: Capillary refill takes less than 2 seconds.   Neurological:      Mental Status: He is alert and oriented to person, place, and time.   Psychiatric:         Mood and Affect: Mood normal.         Behavior: Behavior normal.              None - Discharged                         Stable  No                 Ratable  No        Generally Improved                         Condition Worsened               X   Condition Same  May Have Suffered a Permanent Disability No     Treatment Plan:    Patient at this time was instructed to start over-the-counter analgesics along with use of Flexeril and lidocaine patches as needed for symptoms.  Patient also instructed to use heating pad and gentle range of motion exercises.         No Change in Therapy                  PT/OT Prescribed                      Medication May be Used While Working        Case Management                          PT/OT Discontinued    Consultation    Further Diagnostic Studies:    Prescription(s)      X-ray of lumbar spine:  FINDINGS:  Vertebral body height is well maintained. There is no evidence of fracture. There is convex left scoliotic curvature. There is multilevel decreased disc height and endplate spurring. There is multilevel facet degeneration. There is mild retrolisthesis at   L5-S1.     IMPRESSION:     1.  Multilevel degenerative disc disease and facet degeneration.     2.  No evidence of acute  fracture.        Flexeril 5 to 10 mg nightly as needed/lidocaine patches daily     Released to FULL DUTY /No Restrictions on (Date):       Certified TOTALLY TEMPORARILY DISABLED (Indicate Dates) From:   To:    X  Released to RESTRICTED/Modified Duty on (Date): From: 2/27/2025 To: 3/7/2025  Restrictions Are:  Temporary      No Sitting    No Standing    No Pulling Other: Please allow Patient to rest as needed for symptoms.       No Bending at Waist     No Stooping     No Lifting        No Carrying     No Walking Lifting Restricted to (lbs.):          No Pushing        No Climbing     No Reaching Above Shoulders       Date of Next Visit:  3/7/2025 Date of this Exam: 2/27/2025 Physician/Chiropractic Physician Name: ELOY Solis Physician/Chiropractic Physician Signature:  Constantine Butcher DO MPH     [place]:  [address] [place] [address], [address] [place], [place] [address] - Telephone [phone] [place]:  [address] [place] [address], [address] [place], [place] [address] - Telephone [phone]    [email or web address]  D-39 (Rev. 10/24)

## 2025-02-28 NOTE — PROGRESS NOTES
"Subjective:     Camron Mcmahon is a 64 y.o. male who presents for Back Pain (NEW  DOI 2-15-25 / Lower back pain)    DOI: 2/15/2025    Patient reports that he was going into a walk-in freezer when he slipped causing his right leg to go forward patient was able to catch himself before he fell but he developed immediate pain to his mid low back.  Patient denies any radiation of pain.  Patient denies any numbness or tingling.  Patient has had no incontinence or fevers.  Patient reports that this pain is worse when sitting but better with standing.  Patient reports that the pain has not worsened but has remained unchanged.  Patient has not been taking any medications for this.  Patient does report a history of possible bulging discs of lumbar spine from a previous injury in the past.  Patient reports that symptoms did resolve without any significant treatment at that time.        PMH:   No pertinent past medical history to this problem  MEDS:  Medications were reviewed in EMR  ALLERGIES:  Allergies were reviewed in EMR  SOCHX:  Works as a   FH:   No pertinent family history to this problem       Objective:     BP (!) 146/98 Comment: pt states normal range  Pulse 60   Temp 36.2 °C (97.1 °F) (Temporal)   Resp 16   Ht 1.676 m (5' 6\")   Wt 86.6 kg (191 lb)   SpO2 98%   BMI 30.83 kg/m²     Physical Exam  Vitals and nursing note reviewed.   Constitutional:       General: He is not in acute distress.     Appearance: Normal appearance. He is not ill-appearing.   HENT:      Head: Normocephalic and atraumatic.      Right Ear: Tympanic membrane normal.      Left Ear: Tympanic membrane normal.      Nose: Nose normal.      Mouth/Throat:      Mouth: Mucous membranes are moist.      Pharynx: Oropharynx is clear.   Eyes:      Conjunctiva/sclera: Conjunctivae normal.      Pupils: Pupils are equal, round, and reactive to light.   Cardiovascular:      Rate and Rhythm: Normal rate.      Heart sounds: Normal heart sounds. "   Pulmonary:      Effort: Pulmonary effort is normal.      Breath sounds: Normal breath sounds.   Abdominal:      General: Abdomen is flat.      Palpations: Abdomen is soft.   Musculoskeletal:      Lumbar back: Spasms present. No swelling, edema, deformity, signs of trauma, lacerations, tenderness or bony tenderness. Decreased range of motion.   Skin:     General: Skin is warm and dry.      Capillary Refill: Capillary refill takes less than 2 seconds.   Neurological:      Mental Status: He is alert and oriented to person, place, and time.   Psychiatric:         Mood and Affect: Mood normal.         Behavior: Behavior normal.         RADIOLOGY RESULTS   DX-LUMBAR SPINE-2 OR 3 VIEWS  Result Date: 2/27/2025 2/27/2025 10:05 AM HISTORY/REASON FOR EXAM: Back pain. TECHNIQUE/ EXAM DESCRIPTION AND NUMBER OF VIEWS: 3 views of the lumbar spine. COMPARISON: None. FINDINGS: Vertebral body height is well maintained. There is no evidence of fracture. There is convex left scoliotic curvature. There is multilevel decreased disc height and endplate spurring. There is multilevel facet degeneration. There is mild retrolisthesis at  L5-S1.     1.  Multilevel degenerative disc disease and facet degeneration. 2.  No evidence of acute fracture.           Assessment/Plan:       1. Acute midline low back pain without sciatica  - DX-LUMBAR SPINE-2 OR 3 VIEWS; Future  - cyclobenzaprine (FLEXERIL) 5 mg tablet; Take 1-2 Tablets by mouth at bedtime as needed for Muscle Spasms.  Dispense: 15 Tablet; Refill: 0  - lidocaine (ASPERFLEX) 4 % Patch; Apply patch to painful area. Patch may remain in place for up to 12 hours in any 24-hour period. No more than 1 patch can be used in a 24-hour period.  Dispense: 15 Patch; Refill: 0    2. Elevated blood pressure reading with diagnosis of hypertension    Patient placed on restrictions at this time.  See D39 for details.  Patient to take Flexeril and lidocaine patches as prescribed.  Patient to use  over-the-counter analgesics along with heat/ice and gentle range of motion exercises.  Patient to follow-up in 1 week for reevaluation.    We identified an elevated blood pressure in clinic today at 146/98. We did discussed this elevated reading and the importance of monitoring it regularly, starting a log, and having it rechecked within the month.  They have no signs or symptoms associated with this.  We discussed the impact of elevated blood pressure on the body and that if it stays consistently elevated they may require medication management.      Differential diagnosis, natural history, supportive care, and indications for immediate follow-up discussed.

## 2025-03-07 ENCOUNTER — OCCUPATIONAL MEDICINE (OUTPATIENT)
Dept: OCCUPATIONAL MEDICINE | Facility: CLINIC | Age: 65
End: 2025-03-07
Payer: COMMERCIAL

## 2025-03-07 VITALS
SYSTOLIC BLOOD PRESSURE: 142 MMHG | HEART RATE: 87 BPM | OXYGEN SATURATION: 93 % | TEMPERATURE: 97.3 F | WEIGHT: 190 LBS | RESPIRATION RATE: 16 BRPM | BODY MASS INDEX: 30.53 KG/M2 | DIASTOLIC BLOOD PRESSURE: 78 MMHG | HEIGHT: 66 IN

## 2025-03-07 DIAGNOSIS — S39.012D STRAIN OF LUMBAR REGION, SUBSEQUENT ENCOUNTER: ICD-10-CM

## 2025-03-07 PROCEDURE — 99203 OFFICE O/P NEW LOW 30 MIN: CPT | Performed by: PREVENTIVE MEDICINE

## 2025-03-07 ASSESSMENT — PAIN SCALES - GENERAL: PAINLEVEL_OUTOF10: 4=SLIGHT-MODERATE PAIN

## 2025-03-07 NOTE — PROGRESS NOTES
"Subjective:     Camron Mcmahon is a 64 y.o. male who presents for Other (WC DOI-2/15/25 Lower back- Pain 4-EX 18)    NIKITA: Slipped at work but did not fall but did strain the low back.    3/7/2025: Seen initially in urgent care given cyclobenzaprine.  Patient states that symptoms are approximately 20% improved since being seen in urgent care.  He states the cyclobenzaprine does not seem to help much.  He is taking OTC Tylenol as needed for pain.  He states that his pain much doing his normal job.  He states pain is most on the right side lower back.  Denies radiating pain, numbness or tingling.  Pain is worse with frequent bending or prolonged sitting.      ROS: All systems were reviewed on intake form, form was reviewed and signed. See scanned documents in media. Pertinent positives and negatives included in HPI.    PMH: No pertinent past medical history to this problem  MEDS: Medications were reviewed in Epic  ALLERGIES: No Known Allergies  SOCHX: Works as a  at the [employer]  FH: No pertinent family history to this problem       Objective:     BP (!) 142/78 (BP Location: Left arm, Patient Position: Sitting, BP Cuff Size: Adult)   Pulse 87   Temp 36.3 °C (97.3 °F) (Temporal)   Resp 16   Ht 1.676 m (5' 6\")   Wt 86.2 kg (190 lb)   SpO2 93%   BMI 30.67 kg/m²     Constitutional: Patient is in no acute distress. Appears well-developed and well-nourished.   Cardiovascular: Normal rate.    Pulmonary/Chest: Effort normal. No respiratory distress.   Neurological: Patient is alert and oriented to person, place, and time.   Skin: Skin is warm and dry.   Psychiatric: Normal mood and affect. Behavior is normal.     Lumbar: No gross deformity.  Minimal tenderness over the right lower lumbar paraspinal musculature.  Range of motion diminished with flexion to about 60 degrees.  Rotation intact.    Assessment/Plan:     1. Strain of lumbar region, subsequent encounter      Gradually improving.  Will continue conservative " management  OTC ibuprofen/Tylenol as needed.  OTC muscle creams/ointments as needed  Use prescribed cyclobenzaprine at night as needed  If symptoms do not improve will consider further management with physical therapy or diagnostic studies    Work Status: Full Duty - see D-39 or other state/federal worker's compensation forms for specific restrictions if applicable  Follow up 2 weeks    Differential diagnosis, natural history, supportive care, and indications for immediate follow-up discussed.

## 2025-03-07 NOTE — LETTER
PHYSICIAN’S AND CHIROPRACTIC PHYSICIAN'S   PROGRESS REPORT   CERTIFICATION OF DISABILITY Claim Number:     Social Security Number:    Patient’s Name: Camron Mcmahon Date of Injury: 2/15/2025   Employer: [employer] Name of MCO (if applicable):      Patient’s Job Description/Occupation:        Previous Injuries/Diseases/Surgeries Contributing to the Condition:         Diagnosis: (S39.012D) Strain of lumbar region, subsequent encounter      Related to the Industrial Injury? Yes     Explain:        Objective Medical Findings: Lumbar: No gross deformity.  Minimal tenderness over the right lower lumbar paraspinal musculature.  Range of motion diminished with flexion to about 60 degrees.  Rotation intact.         None - Discharged                         Stable  No                 Ratable  No     X   Generally Improved                         Condition Worsened                  Condition Same  May Have Suffered a Permanent Disability No     Treatment Plan:    Gradually improving.  Will continue conservative management  OTC ibuprofen/Tylenol as needed.  OTC muscle creams/ointments as needed  Use prescribed cyclobenzaprine at night as needed  If symptoms do not improve will consider further management with physical therapy or diagnostic studies         No Change in Therapy                  PT/OT Prescribed                      Medication May be Used While Working        Case Management                          PT/OT Discontinued    Consultation    Further Diagnostic Studies:    Prescription(s)               X  Released to FULL DUTY /No Restrictions on (Date):  3/7/2025    Certified TOTALLY TEMPORARILY DISABLED (Indicate Dates) From:   To:      Released to RESTRICTED/Modified Duty on (Date): From:   To:    Restrictions Are:         No Sitting    No Standing    No Pulling Other:         No Bending at Waist     No Stooping     No Lifting        No Carrying     No Walking Lifting Restricted to (lbs.):          No  Pushing        No Climbing     No Reaching Above Shoulders       Date of Next Visit:  3/21/2025 @ 8:15 AM Date of this Exam: 3/7/2025 Physician/Chiropractic Physician Name: Constantine Butcher D.O. Physician/Chiropractic Physician Signature:  Constantine Butcher DO MPH     [place]:  [address] [place] [address], [address] [place], [place] [address] - Telephone [phone] [place]:  [address] [place] [address], [address] [place], [place] [address] - Telephone [phone]    [email or web address]  D-39 (Rev. 10/24)

## 2025-03-21 ENCOUNTER — OCCUPATIONAL MEDICINE (OUTPATIENT)
Dept: OCCUPATIONAL MEDICINE | Facility: CLINIC | Age: 65
End: 2025-03-21
Payer: COMMERCIAL

## 2025-03-21 VITALS
HEART RATE: 75 BPM | SYSTOLIC BLOOD PRESSURE: 130 MMHG | DIASTOLIC BLOOD PRESSURE: 86 MMHG | HEIGHT: 65 IN | BODY MASS INDEX: 31.62 KG/M2 | TEMPERATURE: 98.6 F | OXYGEN SATURATION: 95 % | RESPIRATION RATE: 16 BRPM

## 2025-03-21 DIAGNOSIS — S39.012D STRAIN OF LUMBAR REGION, SUBSEQUENT ENCOUNTER: ICD-10-CM

## 2025-03-21 PROCEDURE — 3075F SYST BP GE 130 - 139MM HG: CPT | Performed by: PREVENTIVE MEDICINE

## 2025-03-21 PROCEDURE — 1125F AMNT PAIN NOTED PAIN PRSNT: CPT | Performed by: PREVENTIVE MEDICINE

## 2025-03-21 PROCEDURE — 99213 OFFICE O/P EST LOW 20 MIN: CPT | Performed by: PREVENTIVE MEDICINE

## 2025-03-21 PROCEDURE — 3079F DIAST BP 80-89 MM HG: CPT | Performed by: PREVENTIVE MEDICINE

## 2025-03-21 ASSESSMENT — PAIN SCALES - GENERAL: PAINLEVEL_OUTOF10: 5=MODERATE PAIN

## 2025-03-21 NOTE — PROGRESS NOTES
"Subjective:     Camron Mcmahon is a 64 y.o. male who presents for Follow-Up ([facility] - Exam Room 17/)      NIKITA: Slipped at work but did not fall but did strain the low back.     3/7/2025: Seen initially in urgent care given cyclobenzaprine.  Patient states that symptoms are approximately 20% improved since being seen in urgent care.  He states the cyclobenzaprine does not seem to help much.  He is taking OTC Tylenol as needed for pain.  He states that his pain much doing his normal job.  He states pain is most on the right side lower back.  Denies radiating pain, numbness or tingling.  Pain is worse with frequent bending or prolonged sitting.    3/21/2025: Patient states that overall symptoms have improved a little bit.  He however he states he has some persistent pain in the right low back.  He states it feels like it is just popped back into place.  He states that he would be interested in seeing a chiropractor for a few visits to get that treated.  Working full duty without difficulty.    ROS    SOCHX: Works as a  at the [employer]  FH: No pertinent family history to this problem.       Objective:     /86   Pulse 75   Temp 37 °C (98.6 °F) (Temporal)   Resp 16   Ht 1.651 m (5' 5\")   SpO2 95%   BMI 31.62 kg/m²     Constitutional: Patient is in no acute distress. Appears well-developed and well-nourished.   Cardiovascular: Normal rate.    Pulmonary/Chest: Effort normal. No respiratory distress.   Neurological: Patient is alert and oriented to person, place, and time.   Skin: Skin is warm and dry.   Psychiatric: Normal mood and affect. Behavior is normal.     Lumbar: No gross deformity.  Minimal tenderness over the right lower lumbar paraspinal musculature.  Range of motion diminished with flexion to about 60 degrees.  Rotation intact.    Assessment/Plan:     1. Strain of lumbar region, subsequent encounter  - Referral to Chiropractic      Some mild improvements.  Discussed other conservative options.  " Placed referral to chiropractic care  OTC ibuprofen/Tylenol as needed.  OTC muscle creams/ointments as needed  Okay to use cyclobenzaprine at night as needed        Work Status: Full Duty - see D-39 or other state/federal worker's compensation forms for specific restrictions if applicable  Follow up 3 weeks    Differential diagnosis, natural history, supportive care, and indications for immediate follow-up discussed.

## 2025-03-21 NOTE — LETTER
Hearing a new case for PHYSICIAN’S AND CHIROPRACTIC PHYSICIAN'S   PROGRESS REPORT   CERTIFICATION OF DISABILITY Claim Number:     Social Security Number:    Patient’s Name: Camron Mcmahon Date of Injury: 2/15/2025   Employer: [employer] Name of MCO (if applicable):      Patient’s Job Description/Occupation:        Previous Injuries/Diseases/Surgeries Contributing to the Condition:         Diagnosis: (S39.012D) Strain of lumbar region, subsequent encounter      Related to the Industrial Injury? Yes     Explain:        Objective Medical Findings: Lumbar: No gross deformity.  Minimal tenderness over the right lower lumbar paraspinal musculature.  Range of motion diminished with flexion to about 60 degrees.  Rotation intact.         None - Discharged                         Stable  No                 Ratable  No     X   Generally Improved                         Condition Worsened                  Condition Same  May Have Suffered a Permanent Disability No     Treatment Plan:    Some mild improvements.  Discussed other conservative options.  Placed referral to chiropractic care  OTC ibuprofen/Tylenol as needed.  OTC muscle creams/ointments as needed  Okay to use cyclobenzaprine at night as needed             No Change in Therapy                  PT/OT Prescribed                      Medication May be Used While Working        Case Management                          PT/OT Discontinued    Consultation    Further Diagnostic Studies:    Prescription(s)               X  Released to FULL DUTY /No Restrictions on (Date):  3/21/2025    Certified TOTALLY TEMPORARILY DISABLED (Indicate Dates) From:   To:      Released to RESTRICTED/Modified Duty on (Date): From:   To:    Restrictions Are:         No Sitting    No Standing    No Pulling Other:         No Bending at Waist     No Stooping     No Lifting        No Carrying     No Walking Lifting Restricted to (lbs.):          No Pushing        No Climbing     No  Reaching Above Shoulders       Date of Next Visit:  Friday 4/11/2025  @ 8:00 AM Date of this Exam: 3/21/2025 Physician/Chiropractic Physician Name: Constantine Butcher D.O. Physician/Chiropractic Physician Signature:  Constantine Butcher DO MPH     [place]:  [facility]  [facility], [address] [place], [place] [address] - Telephone [phone] [place]:  [address] [place] [address], [address] [place], [place] [address] - Telephone [phone]    [email or web address]  D-39 (Rev. 10/24)

## 2025-03-28 NOTE — Clinical Note
REFERRAL APPROVAL NOTICE         Sent on March 28, 2025                   Camron Mcmahon  [address] [place] [address]                   Dear Mr. Mcmahon,    After a careful review of the medical information and benefit coverage, [facility] has processed your referral. See below for additional details.    If applicable, you must be actively enrolled with your insurance for coverage of the authorized service. If you have any questions regarding your coverage, please contact your insurance directly.    REFERRAL INFORMATION   Referral #:  [id number]  Referred-To Provider    Referred-By Provider:  Chiropractic    TITA Sousa ALAN S      [address] [facility] [address]  [place] [address]  [phone] [address] [facility] [facility] [address]  [place] [address]  [phone]    Referral Start Date:  03/21/2025  Referral End Date:   03/21/2026             SCHEDULING  If you do not already have an appointment, please call [phone] to make an appointment.     MORE INFORMATION  If you do not already have a [employer] account, sign up at: [email or web address]  You can access your medical information, make appointments, see lab results, billing information, and more.  If you have questions regarding this referral, please contact  the [facility] Referrals department at:             [phone]. Monday - Friday 8:00AM - 5:00PM.     Sincerely,    [facility]

## 2025-04-01 ENCOUNTER — APPOINTMENT (OUTPATIENT)
Dept: DERMATOLOGY | Facility: IMAGING CENTER | Age: 65
End: 2025-04-01
Payer: COMMERCIAL

## 2025-04-01 DIAGNOSIS — D22.9 MULTIPLE NEVI: ICD-10-CM

## 2025-04-01 DIAGNOSIS — L80 VITILIGO: ICD-10-CM

## 2025-04-01 DIAGNOSIS — L11.1 TRANSIENT ACANTHOLYTIC DERMATOSIS (GROVER): ICD-10-CM

## 2025-04-01 DIAGNOSIS — L57.8 OTHER SKIN CHANGES DUE TO CHRONIC EXPOSURE TO NONIONIZING RADIATION: ICD-10-CM

## 2025-04-01 DIAGNOSIS — L82.1 SEBORRHEIC KERATOSIS: ICD-10-CM

## 2025-04-01 DIAGNOSIS — D18.01 CHERRY ANGIOMA: ICD-10-CM

## 2025-04-01 PROCEDURE — 99203 OFFICE O/P NEW LOW 30 MIN: CPT | Performed by: STUDENT IN AN ORGANIZED HEALTH CARE EDUCATION/TRAINING PROGRAM

## 2025-04-01 NOTE — PROGRESS NOTES
[facility] DERMATOLOGY CLINIC NOTE    Chief Complaint   Patient presents with    [facility] Care     SARAY        HPI:    Camron Mcmahon is a 64 y.o. male here for evaluation of above, SARAY.     Today notes following concern:   - No concerns today     No other symptomatic (itching, painful, burning) or changing lesions.       Dermatology History:      - Prior history of skin cancer: No     - Family history of skin cancer: No      Review of Systems: No fevers, chill. Pertinent positives and negatives above.       Medications, Medical History, Surgical History, Family History & Allergies:  Reviewed in the chart, relevant history noted above.       PHYSICAL EXAM  Full body skin check of the scalp, face, neck, trunk, groin, extremities was performed.   Skin type 2    - depigmented macules and patches on the dorsal hands, L forearm  - scattered red scaly papules on the abdomen, back  - scattered melanotic macules and papules on the trunk and extremities  - scattered tan macules without surface scale on sun exposed areas of face, neck, upper chest, arms  - scattered tan waxy to gray stuck on papules and plaques on the trunk and extremities  - scattered 2-3mm red papules on trunk, extremities    ASSESSMENT & PLAN    # Vitiligo  Counseled patient on etiology and management options.   - Patient declined treatment, does not bother him  - Sun avoidance, sun protection with SPF30+ sunscreen and photoprotective clothing recommended    # Transient acantholytic dermatosis (Ej's disease)  Counseled this is a common acquired rash affecting the truncal area, common in men age 30 and older. Exact cause unknown but linked to heat, sweat, and excessive sun exposure. In some cases, self-limited, in other it is chronic.  - Recommend avoiding excessive heat and sweat  - discussed topical steroid for treating symptoms, patient deferred, does not bother him and no symptoms    # Melanotic nevi  - clinically benign appearing today  - ABCDEs of  atypical appearing moles discussed.     # Lentigines  - discussed this is a result of sun exposure, photodamage  - regular sun protective practices with hats/clothing, SPF 30+ with regular application and reapplication recommended    # Seborrheic keratosis  # Cherry angioma  - reassure benign, no treatment needed      Skin Cancer Prevention Counseling   Advise regular sun protection/sunscreen use, SPF 30 or greater, recommend mineral sunscreen, and to reapply every  minutes.   Recommend broad brimmed hats, UPF sun protective clothing when outdoors for extended periods of time   Recommend self checks at home,  ABCDEs of melanoma discussed       Return in about 1 year (around 4/1/2026) for Skin check.      Merline Fregoso MD  [facility]

## 2025-04-10 ENCOUNTER — TELEPHONE (OUTPATIENT)
Dept: OCCUPATIONAL MEDICINE | Facility: CLINIC | Age: 65
End: 2025-04-10
Payer: COMMERCIAL

## 2025-04-10 NOTE — TELEPHONE ENCOUNTER
Phone Number Called: There are no phone numbers on file.      Call outcome: Did not leave a detailed message. Requested patient to call back.    Message: Chiropractic Referral Approved 03/28. Dr. Rodriguez - [phone]        Closure 2 Information: This tab is for additional flaps and grafts, including complex repair and grafts and complex repair and flaps. You can also specify a different location for the additional defect, if the location is the same you do not need to select a new one. We will insert the automated text for the repair you select below just as we do for solitary flaps and grafts. Please note that at this time if you select a location with a different insurance zone you will need to override the ICD10 and CPT if appropriate.

## 2025-04-11 ENCOUNTER — OCCUPATIONAL MEDICINE (OUTPATIENT)
Dept: OCCUPATIONAL MEDICINE | Facility: CLINIC | Age: 65
End: 2025-04-11
Payer: COMMERCIAL

## 2025-04-11 VITALS
HEIGHT: 66 IN | BODY MASS INDEX: 30.86 KG/M2 | DIASTOLIC BLOOD PRESSURE: 78 MMHG | HEART RATE: 64 BPM | SYSTOLIC BLOOD PRESSURE: 124 MMHG | OXYGEN SATURATION: 95 % | WEIGHT: 192 LBS | RESPIRATION RATE: 16 BRPM | TEMPERATURE: 98 F

## 2025-04-11 DIAGNOSIS — S39.012D STRAIN OF LUMBAR REGION, SUBSEQUENT ENCOUNTER: ICD-10-CM

## 2025-04-11 PROCEDURE — 1125F AMNT PAIN NOTED PAIN PRSNT: CPT | Performed by: PREVENTIVE MEDICINE

## 2025-04-11 PROCEDURE — 3074F SYST BP LT 130 MM HG: CPT | Performed by: PREVENTIVE MEDICINE

## 2025-04-11 PROCEDURE — 99213 OFFICE O/P EST LOW 20 MIN: CPT | Performed by: PREVENTIVE MEDICINE

## 2025-04-11 PROCEDURE — 3078F DIAST BP <80 MM HG: CPT | Performed by: PREVENTIVE MEDICINE

## 2025-04-11 ASSESSMENT — PAIN SCALES - GENERAL: PAINLEVEL_OUTOF10: 2=MINIMAL-SLIGHT

## 2025-04-11 NOTE — LETTER
PHYSICIAN’S AND CHIROPRACTIC PHYSICIAN'S   PROGRESS REPORT   CERTIFICATION OF DISABILITY Claim Number:     Social Security Number:    Patient’s Name: Camron Mcmahon Date of Injury: 2/15/2025   Employer: [employer] Name of MCO (if applicable):      Patient’s Job Description/Occupation:        Previous Injuries/Diseases/Surgeries Contributing to the Condition:         Diagnosis: (S39.012D) Strain of lumbar region, subsequent encounter      Related to the Industrial Injury? Yes     Explain:        Objective Medical Findings: Lumbar: No gross deformity.  Area of pain over the right lower lumbar paraspinal musculature.  Range of motion diminished with flexion to about 60 degrees.  Rotation intact.         None - Discharged                         Stable  No                 Ratable  No     X   Generally Improved                         Condition Worsened                  Condition Same  May Have Suffered a Permanent Disability No     Treatment Plan:    Noted some gradual improvement with chiropractic care.  Will place referral for more visits  OTC ibuprofen/Tylenol as needed.  OTC muscle creams/ointments as needed  Okay to use heat/ice as needed         No Change in Therapy                  PT/OT Prescribed                      Medication May be Used While Working        Case Management                          PT/OT Discontinued    Consultation    Further Diagnostic Studies:    Prescription(s)               X  Released to FULL DUTY /No Restrictions on (Date):  4/11/2025    Certified TOTALLY TEMPORARILY DISABLED (Indicate Dates) From:   To:      Released to RESTRICTED/Modified Duty on (Date): From:   To:    Restrictions Are:         No Sitting    No Standing    No Pulling Other:         No Bending at Waist     No Stooping     No Lifting        No Carrying     No Walking Lifting Restricted to (lbs.):          No Pushing        No Climbing     No Reaching Above Shoulders       Date of Next  Visit:  Friday 5/9/2025  @ 10:00 AM Date of this Exam: 4/11/2025 Physician/Chiropractic Physician Name: Constantine Butcher D.O. Physician/Chiropractic Physician Signature:  Constantine Butcher DO MPH     [place]:  [address] [place] [address], [address] [place], [place] [address] - Telephone [phone] [place]:  [address] [place] [address], [address] [place], [place] [address] - Telephone [phone]    [email or web address]  D-39 (Rev. 10/24)

## 2025-04-11 NOTE — PROGRESS NOTES
"Subjective:     Camron Mcmahon is a 64 y.o. male who presents for Other (WC FV DOI 2/15/25 LOWER BACK- EX 16)    NIKITA: Slipped at work but did not fall but did strain the low back.     3/7/2025: Seen initially in urgent care given cyclobenzaprine.  Patient states that symptoms are approximately 20% improved since being seen in urgent care.  He states the cyclobenzaprine does not seem to help much.  He is taking OTC Tylenol as needed for pain.  He states that his pain much doing his normal job.  He states pain is most on the right side lower back.  Denies radiating pain, numbness or tingling.  Pain is worse with frequent bending or prolonged sitting.     3/21/2025: Patient states that overall symptoms have improved a little bit.  He however he states he has some persistent pain in the right low back.  He states it feels like it is just popped back into place.  He states that he would be interested in seeing a chiropractor for a few visits to get that treated.  Working full duty without difficulty.    4/11/2025: Patient states overall symptoms are a little bit improved.  He states that he still has days where he does not quite a bit of pain.  He states he does not wake up most mornings with some stiffness in the back.  He states his range of motion still is not full.  He states he has been seeing the chiropractor which has been helping symptoms.  He states he has been seen 3-4 times.  Would like to continue with further chiropractic treatment.  He is tolerating his full duty job.    ROS    SOCHX: Works as a  at the [employer]  FH: No pertinent family history to this problem.       Objective:     /78 (BP Location: Right arm, Patient Position: Sitting, BP Cuff Size: Adult)   Pulse 64   Temp 36.7 °C (98 °F) (Temporal)   Resp 16   Ht 1.676 m (5' 6\")   Wt 87.1 kg (192 lb)   SpO2 95%   BMI 30.99 kg/m²     Constitutional: Patient is in no acute distress. Appears well-developed and well-nourished. "   Cardiovascular: Normal rate.    Pulmonary/Chest: Effort normal. No respiratory distress.   Neurological: Patient is alert and oriented to person, place, and time.   Skin: Skin is warm and dry.   Psychiatric: Normal mood and affect. Behavior is normal.     Lumbar: No gross deformity.  Area of pain over the right lower lumbar paraspinal musculature.  Range of motion diminished with flexion to about 60 degrees.  Rotation intact.    Assessment/Plan:     1. Strain of lumbar region, subsequent encounter  - Referral to Chiropractic      Noted some gradual improvement with chiropractic care.  Will place referral for more visits  OTC ibuprofen/Tylenol as needed.  OTC muscle creams/ointments as needed  Okay to use heat/ice as needed    Work Status: Restricted Duty - see D-39 or other state/federal worker's compensation forms for specific restrictions if applicable  Follow up 4 weeks    Differential diagnosis, natural history, supportive care, and indications for immediate follow-up discussed.

## 2025-04-17 NOTE — Clinical Note
REFERRAL APPROVAL NOTICE         Sent on April 17, 2025                   Camron Mcmahon  [address] [place] [address]                   Dear Mr. Mcmahon,    After a careful review of the medical information and benefit coverage, [facility] has processed your referral. See below for additional details.    If applicable, you must be actively enrolled with your insurance for coverage of the authorized service. If you have any questions regarding your coverage, please contact your insurance directly.    REFERRAL INFORMATION   Referral #:  [id number]  Referred-To Provider    Referred-By Provider:  Chiropractic    TITA Sousa ALAN S      [address] [facility] [address]  [place] [address]  [phone] [address] [facility] [facility] [address]  [place] [address]  [phone]    Referral Start Date:  04/11/2025  Referral End Date:   04/11/2026             SCHEDULING  If you do not already have an appointment, please call [phone] to make an appointment.     MORE INFORMATION  If you do not already have a [employer] account, sign up at: [email or web address]  You can access your medical information, make appointments, see lab results, billing information, and more.  If you have questions regarding this referral, please contact  the [facility] Referrals department at:             [phone]. Monday - Friday 8:00AM - 5:00PM.     Sincerely,    [facility]

## 2025-05-08 ENCOUNTER — TELEPHONE (OUTPATIENT)
Dept: OCCUPATIONAL MEDICINE | Facility: CLINIC | Age: 65
End: 2025-05-08
Payer: COMMERCIAL

## 2025-05-09 ENCOUNTER — OCCUPATIONAL MEDICINE (OUTPATIENT)
Dept: OCCUPATIONAL MEDICINE | Facility: CLINIC | Age: 65
End: 2025-05-09
Payer: COMMERCIAL

## 2025-05-09 VITALS
HEART RATE: 64 BPM | SYSTOLIC BLOOD PRESSURE: 136 MMHG | TEMPERATURE: 97.9 F | BODY MASS INDEX: 30.57 KG/M2 | DIASTOLIC BLOOD PRESSURE: 78 MMHG | OXYGEN SATURATION: 96 % | WEIGHT: 190.2 LBS | HEIGHT: 66 IN | RESPIRATION RATE: 14 BRPM

## 2025-05-09 DIAGNOSIS — S39.012D STRAIN OF LUMBAR REGION, SUBSEQUENT ENCOUNTER: ICD-10-CM

## 2025-05-09 PROCEDURE — 3075F SYST BP GE 130 - 139MM HG: CPT | Performed by: PREVENTIVE MEDICINE

## 2025-05-09 PROCEDURE — 99213 OFFICE O/P EST LOW 20 MIN: CPT | Performed by: PREVENTIVE MEDICINE

## 2025-05-09 PROCEDURE — 3078F DIAST BP <80 MM HG: CPT | Performed by: PREVENTIVE MEDICINE

## 2025-05-09 NOTE — PROGRESS NOTES
Subjective:     Camron Mcmahon is a 65 y.o. male who presents for Follow-Up (WC slight improvement )    NIKITA: Slipped at work but did not fall but did strain the low back.     3/7/2025: Seen initially in urgent care given cyclobenzaprine.  Patient states that symptoms are approximately 20% improved since being seen in urgent care.  He states the cyclobenzaprine does not seem to help much.  He is taking OTC Tylenol as needed for pain.  He states that his pain much doing his normal job.  He states pain is most on the right side lower back.  Denies radiating pain, numbness or tingling.  Pain is worse with frequent bending or prolonged sitting.     3/21/2025: Patient states that overall symptoms have improved a little bit.  He however he states he has some persistent pain in the right low back.  He states it feels like it is just popped back into place.  He states that he would be interested in seeing a chiropractor for a few visits to get that treated.  Working full duty without difficulty.     4/11/2025: Patient states overall symptoms are a little bit improved.  He states that he still has days where he does not quite a bit of pain.  He states he does not wake up most mornings with some stiffness in the back.  He states his range of motion still is not full.  He states he has been seeing the chiropractor which has been helping symptoms.  He states he has been seen 3-4 times.  Would like to continue with further chiropractic treatment.  He is tolerating his full duty job.    5/9/2025: Patient states that overall symptoms only had improved a little bit.  He states chiropractic care does seem to help but still only feels about 50 to 60% improved.  He has noted a few episodes of radicular pain down the left leg but nothing really consistent.  He has completed most of chiropractic care but is like to do more.  Working full duty, gets sore by the end of the shift but not too bad.    RAQUEL BANKS: Works as a  at the  "[employer]   FH: No pertinent family history to this problem.       Objective:     /78 (BP Location: Right arm, Patient Position: Sitting, BP Cuff Size: Adult)   Pulse 64   Temp 36.6 °C (97.9 °F) (Temporal)   Resp 14   Ht 1.676 m (5' 6\")   Wt 86.3 kg (190 lb 3.2 oz)   SpO2 96%   BMI 30.70 kg/m²     Constitutional: Patient is in no acute distress. Appears well-developed and well-nourished.   Cardiovascular: Normal rate.    Pulmonary/Chest: Effort normal. No respiratory distress.   Neurological: Patient is alert and oriented to person, place, and time.   Skin: Skin is warm and dry.   Psychiatric: Normal mood and affect. Behavior is normal.     Lumbar: No gross deformity.  Area of pain over the right and midline lower lumbar paraspinal musculature.  Range of motion diminished with flexion to about 60 degrees.  Rotation intact.    Assessment/Plan:     1. Strain of lumbar region, subsequent encounter  - Referral to Radiology  - MR-LUMBAR SPINE-W/O; Future  - Referral to Chiropractic      Given duration of symptoms will refer for MRI Lumbar w/o  Noted some gradual improvement with chiropractic care.  Will place referral for more visits  OTC ibuprofen/Tylenol as needed.  OTC muscle creams/ointments as needed  Okay to use heat/ice as needed      Work Status: Restricted Duty - see D-39 or other state/federal worker's compensation forms for specific restrictions if applicable  Follow up 4 weeks    Differential diagnosis, natural history, supportive care, and indications for immediate follow-up discussed.      "

## 2025-05-09 NOTE — LETTER
PHYSICIAN’S AND CHIROPRACTIC PHYSICIAN'S   PROGRESS REPORT   CERTIFICATION OF DISABILITY Claim Number:     Social Security Number:    Patient’s Name: Camron Mcmahon Date of Injury: 2/15/2025   Employer: [employer] Name of MCO (if applicable):      Patient’s Job Description/Occupation:        Previous Injuries/Diseases/Surgeries Contributing to the Condition:         Diagnosis: (S39.012D) Strain of lumbar region, subsequent encounter      Related to the Industrial Injury? Yes     Explain:        Objective Medical Findings: Lumbar: No gross deformity.  Area of pain over the right and midline lower lumbar paraspinal musculature.  Range of motion diminished with flexion to about 60 degrees.  Rotation intact.         None - Discharged                         Stable  No                 Ratable  No        Generally Improved                         Condition Worsened               X   Condition Same  May Have Suffered a Permanent Disability No     Treatment Plan:    Given duration of symptoms will refer for MRI Lumbar w/o  Noted some gradual improvement with chiropractic care.  Will place referral for more visits  OTC ibuprofen/Tylenol as needed.  OTC muscle creams/ointments as needed  Okay to use heat/ice as needed           No Change in Therapy                  PT/OT Prescribed                      Medication May be Used While Working        Case Management                          PT/OT Discontinued    Consultation    Further Diagnostic Studies:    Prescription(s)               X  Released to FULL DUTY /No Restrictions on (Date):  5/9/2025    Certified TOTALLY TEMPORARILY DISABLED (Indicate Dates) From:   To:      Released to RESTRICTED/Modified Duty on (Date): From:   To:    Restrictions Are:         No Sitting    No Standing    No Pulling Other:         No Bending at Waist     No Stooping     No Lifting        No Carrying     No Walking Lifting Restricted to (lbs.):          No Pushing        No Climbing      No Reaching Above Shoulders       Date of Next Visit:  6/6/2025  @10 am  Date of this Exam: 5/9/2025 Physician/Chiropractic Physician Name: Constantine Butcher D.O. Physician/Chiropractic Physician Signature:  Constantine Butcher DO MPH     [place]:  [facility]  [facility], [address] [place], [place] [address] - Telephone [phone] [place]:  [address] [place] [address], [address] [place], [place] [address] - Telephone [phone]    [email or web address]  D-39 (Rev. 10/24)

## 2025-05-14 NOTE — Clinical Note
REFERRAL APPROVAL NOTICE         Sent on May 14, 2025                   Camron Lisandro  [address] [place] [address]                   Dear Mr. Mcmahon,    After a careful review of the medical information and benefit coverage, [facility] has processed your referral. See below for additional details.    If applicable, you must be actively enrolled with your insurance for coverage of the authorized service. If you have any questions regarding your coverage, please contact your insurance directly.    REFERRAL INFORMATION   Referral #:  [id number]  Referred-To Department    Referred-By Provider:  Radiology    Constantine Butcher D.O.   [facility] [address] [address]  [phone] [address] [place] [address]  [phone]    Referral Start Date:  05/09/2025  Referral End Date:   05/09/2026             SCHEDULING  If you do not already have an appointment, please call [phone] to make an appointment.     MORE INFORMATION  If you do not already have a [employer] account, sign up at: [email or web address]  You can access your medical information, make appointments, see lab results, billing information, and more.  If you have questions regarding this referral, please contact  the [facility] Referrals department at:             [phone]. Monday - Friday 8:00AM - 5:00PM.     Sincerely,    [facility]

## 2025-05-28 ENCOUNTER — HOSPITAL ENCOUNTER (OUTPATIENT)
Dept: RADIOLOGY | Facility: MEDICAL CENTER | Age: 65
End: 2025-05-28
Attending: PREVENTIVE MEDICINE
Payer: COMMERCIAL

## 2025-05-28 DIAGNOSIS — S39.012D STRAIN OF LUMBAR REGION, SUBSEQUENT ENCOUNTER: ICD-10-CM

## 2025-05-28 PROCEDURE — 72148 MRI LUMBAR SPINE W/O DYE: CPT

## 2025-06-05 ENCOUNTER — TELEPHONE (OUTPATIENT)
Dept: OCCUPATIONAL MEDICINE | Facility: CLINIC | Age: 65
End: 2025-06-05
Payer: COMMERCIAL

## 2025-06-06 ENCOUNTER — OCCUPATIONAL MEDICINE (OUTPATIENT)
Dept: OCCUPATIONAL MEDICINE | Facility: CLINIC | Age: 65
End: 2025-06-06
Payer: COMMERCIAL

## 2025-06-06 VITALS
BODY MASS INDEX: 30.22 KG/M2 | TEMPERATURE: 97.3 F | SYSTOLIC BLOOD PRESSURE: 120 MMHG | HEIGHT: 66 IN | RESPIRATION RATE: 14 BRPM | DIASTOLIC BLOOD PRESSURE: 86 MMHG | HEART RATE: 55 BPM | WEIGHT: 188 LBS | OXYGEN SATURATION: 97 %

## 2025-06-06 DIAGNOSIS — S39.012D STRAIN OF LUMBAR REGION, SUBSEQUENT ENCOUNTER: Primary | ICD-10-CM

## 2025-06-06 PROCEDURE — 99213 OFFICE O/P EST LOW 20 MIN: CPT | Performed by: PREVENTIVE MEDICINE

## 2025-06-06 ASSESSMENT — PAIN SCALES - GENERAL: PAINLEVEL_OUTOF10: 2=MINIMAL-SLIGHT

## 2025-06-06 NOTE — LETTER
PHYSICIAN’S AND CHIROPRACTIC PHYSICIAN'S   PROGRESS REPORT   CERTIFICATION OF DISABILITY Claim Number:     Social Security Number:    Patient’s Name: Camron Mcmahon Date of Injury: 2/15/2025   Employer: [employer] Name of [employer] (if applicable):      Patient’s Job Description/Occupation:        Previous Injuries/Diseases/Surgeries Contributing to the Condition:         Diagnosis: (S39.012D) Strain of lumbar region, subsequent encounter  (primary encounter diagnosis)      Related to the Industrial Injury? Yes     Explain:        Objective Medical Findings: MRI lumbar: Mild degenerative disc disease at L5-S1, L4-L5, with mild disc bulge, mild facet arthropathy and mild bilateral neuroforaminal stenosis  Lumbar: No gross deformity.  Area of pain over the right and midline lower lumbar paraspinal musculature.  Range of motion painful with flexion to about 60 degrees.  Rotation intact.         None - Discharged                         Stable  No                 Ratable  No        Generally Improved                         Condition Worsened               X   Condition Same  May Have Suffered a Permanent Disability No     Treatment Plan:    Given continued symptoms and MRI findings will refer for physiatry   Further chiropractic treatments have been denied  OTC ibuprofen/Tylenol as needed.  OTC muscle creams/ointments as needed  Okay to use heat/ice as needed  Follow-up here if not seen by physiatry         No Change in Therapy                  PT/OT Prescribed                      Medication May be Used While Working        Case Management                          PT/OT Discontinued    Consultation    Further Diagnostic Studies:    Prescription(s)               X  Released to FULL DUTY /No Restrictions on (Date):  6/6/2025    Certified TOTALLY TEMPORARILY DISABLED (Indicate Dates) From:   To:      Released to RESTRICTED/Modified Duty on (Date): From:   To:    Restrictions Are:         No Sitting    No Standing     No Pulling Other:         No Bending at Waist     No Stooping     No Lifting        No Carrying     No Walking Lifting Restricted to (lbs.):          No Pushing        No Climbing     No Reaching Above Shoulders       Date of Next Visit:  7/11/2025 @ 9:15 AM Date of this Exam: 6/6/2025 Physician/Chiropractic Physician Name: Constantine Butcher D.O. Physician/Chiropractic Physician Signature:  Constantine Butcher DO MPH     [place]:  [address] [place] [address], [address] [place], [place] [address] - Telephone [phone] [place]:  [address] [place] [address], [address] [place], [place] [address] - Telephone [phone]    [email or web address]  D-39 (Rev. 10/24)
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
## 2025-06-13 NOTE — Clinical Note
REFERRAL APPROVAL NOTICE         Sent on June 13, 2025                   Camron Mcmahon  [address] [place] [address]                   Dear Mr. Mcmahon,    After a careful review of the medical information and benefit coverage, [facility] has processed your referral. See below for additional details.    If applicable, you must be actively enrolled with your insurance for coverage of the authorized service. If you have any questions regarding your coverage, please contact your insurance directly.    REFERRAL INFORMATION   Referral #:  [id number]  Referred-To Provider    Referred-By Provider:  Phys Med and Rehab    Constantine Butcher D.O.   [facility]      [address] [facility] [address]  [place] [address]  [phone] [address] [facility]  [address]  [place] [address]  [phone]    Referral Start Date:  06/06/2025  Referral End Date:   06/06/2026             SCHEDULING  If you do not already have an appointment, please call [phone] to make an appointment.     MORE INFORMATION  If you do not already have a [employer] account, sign up at: [email or web address]  You can access your medical information, make appointments, see lab results, billing information, and more.  If you have questions regarding this referral, please contact  the [facility] Referrals department at:             [phone]. Monday - Friday 8:00AM - 5:00PM.     Sincerely,    [facility]

## 2025-08-21 ENCOUNTER — OFFICE VISIT (OUTPATIENT)
Dept: MEDICAL GROUP | Age: 65
End: 2025-08-21
Payer: COMMERCIAL

## 2025-08-21 ENCOUNTER — RESULTS FOLLOW-UP (OUTPATIENT)
Dept: MEDICAL GROUP | Age: 65
End: 2025-08-21

## 2025-08-21 VITALS
WEIGHT: 190 LBS | SYSTOLIC BLOOD PRESSURE: 126 MMHG | RESPIRATION RATE: 16 BRPM | OXYGEN SATURATION: 96 % | HEART RATE: 66 BPM | BODY MASS INDEX: 30.53 KG/M2 | DIASTOLIC BLOOD PRESSURE: 64 MMHG | TEMPERATURE: 98 F | HEIGHT: 66 IN

## 2025-08-21 DIAGNOSIS — E55.9 VITAMIN D DEFICIENCY: ICD-10-CM

## 2025-08-21 DIAGNOSIS — I10 ESSENTIAL HYPERTENSION: ICD-10-CM

## 2025-08-21 DIAGNOSIS — M79.644 FINGER PAIN, RIGHT: ICD-10-CM

## 2025-08-21 DIAGNOSIS — E78.00 PURE HYPERCHOLESTEROLEMIA: ICD-10-CM

## 2025-08-21 DIAGNOSIS — R73.03 PREDIABETES: Primary | ICD-10-CM

## 2025-08-21 LAB
HBA1C MFR BLD: 5.7 % (ref ?–5.8)
POCT INT CON NEG: NEGATIVE
POCT INT CON POS: POSITIVE

## 2025-08-21 PROCEDURE — 83036 HEMOGLOBIN GLYCOSYLATED A1C: CPT | Performed by: INTERNAL MEDICINE

## 2025-08-21 PROCEDURE — 3074F SYST BP LT 130 MM HG: CPT | Performed by: INTERNAL MEDICINE

## 2025-08-21 PROCEDURE — 3078F DIAST BP <80 MM HG: CPT | Performed by: INTERNAL MEDICINE

## 2025-08-21 PROCEDURE — 99214 OFFICE O/P EST MOD 30 MIN: CPT | Performed by: INTERNAL MEDICINE

## 2025-08-21 RX ORDER — MELOXICAM 15 MG/1
15 TABLET ORAL
Qty: 30 TABLET | Refills: 0 | Status: SHIPPED | OUTPATIENT
Start: 2025-08-21

## 2025-08-21 ASSESSMENT — ENCOUNTER SYMPTOMS: BACK PAIN: 1

## 2025-08-29 ENCOUNTER — HOSPITAL ENCOUNTER (OUTPATIENT)
Dept: RADIOLOGY | Facility: MEDICAL CENTER | Age: 65
End: 2025-08-29
Attending: INTERNAL MEDICINE
Payer: COMMERCIAL

## 2025-08-29 PROCEDURE — 73140 X-RAY EXAM OF FINGER(S): CPT | Mod: RT

## 2025-09-12 ENCOUNTER — HOSPITAL ENCOUNTER (OUTPATIENT)
Dept: RADIOLOGY | Facility: MEDICAL CENTER | Age: 65
End: 2025-08-25
Attending: INTERNAL MEDICINE
Payer: COMMERCIAL

## 2025-09-12 DIAGNOSIS — M79.644 FINGER PAIN, RIGHT: ICD-10-CM
